# Patient Record
Sex: FEMALE | Race: WHITE | NOT HISPANIC OR LATINO | Employment: OTHER | URBAN - METROPOLITAN AREA
[De-identification: names, ages, dates, MRNs, and addresses within clinical notes are randomized per-mention and may not be internally consistent; named-entity substitution may affect disease eponyms.]

---

## 2017-02-13 ENCOUNTER — TRANSCRIBE ORDERS (OUTPATIENT)
Dept: ADMINISTRATIVE | Facility: HOSPITAL | Age: 47
End: 2017-02-13

## 2017-02-13 DIAGNOSIS — Z12.31 VISIT FOR SCREENING MAMMOGRAM: Primary | ICD-10-CM

## 2017-03-02 ENCOUNTER — HOSPITAL ENCOUNTER (OUTPATIENT)
Dept: MAMMOGRAPHY | Facility: HOSPITAL | Age: 47
Discharge: HOME | End: 2017-03-02
Attending: FAMILY MEDICINE | Admitting: FAMILY MEDICINE

## 2017-03-02 DIAGNOSIS — Z12.31 VISIT FOR SCREENING MAMMOGRAM: ICD-10-CM

## 2017-03-02 PROCEDURE — 77067 SCR MAMMO BI INCL CAD: CPT | Performed by: RADIOLOGY

## 2017-03-02 PROCEDURE — 77063 BREAST TOMOSYNTHESIS BI: CPT

## 2017-03-02 PROCEDURE — 77063 BREAST TOMOSYNTHESIS BI: CPT | Performed by: RADIOLOGY

## 2017-03-02 PROCEDURE — G0202 SCR MAMMO BI INCL CAD: HCPCS

## 2018-06-18 ENCOUNTER — TRANSCRIBE ORDERS (OUTPATIENT)
Dept: ADMINISTRATIVE | Facility: HOSPITAL | Age: 48
End: 2018-06-18

## 2018-06-18 DIAGNOSIS — Z12.31 VISIT FOR SCREENING MAMMOGRAM: Primary | ICD-10-CM

## 2018-06-19 ENCOUNTER — HOSPITAL ENCOUNTER (OUTPATIENT)
Dept: MAMMOGRAPHY | Facility: HOSPITAL | Age: 48
Discharge: HOME | End: 2018-06-19
Attending: FAMILY MEDICINE | Admitting: FAMILY MEDICINE

## 2018-06-19 DIAGNOSIS — Z12.31 VISIT FOR SCREENING MAMMOGRAM: ICD-10-CM

## 2018-06-19 PROCEDURE — 77067 SCR MAMMO BI INCL CAD: CPT

## 2018-06-19 PROCEDURE — 77067 SCR MAMMO BI INCL CAD: CPT | Performed by: RADIOLOGY

## 2018-06-19 PROCEDURE — 77063 BREAST TOMOSYNTHESIS BI: CPT

## 2018-06-19 PROCEDURE — 77063 BREAST TOMOSYNTHESIS BI: CPT | Performed by: RADIOLOGY

## 2019-06-14 ENCOUNTER — TRANSCRIBE ORDERS (OUTPATIENT)
Dept: ADMINISTRATIVE | Facility: HOSPITAL | Age: 49
End: 2019-06-14

## 2019-06-14 DIAGNOSIS — Z12.31 VISIT FOR SCREENING MAMMOGRAM: Primary | ICD-10-CM

## 2019-07-25 ENCOUNTER — HOSPITAL ENCOUNTER (OUTPATIENT)
Dept: MAMMOGRAPHY | Facility: HOSPITAL | Age: 49
Discharge: HOME | End: 2019-07-25
Admitting: FAMILY MEDICINE

## 2019-07-25 DIAGNOSIS — Z12.31 VISIT FOR SCREENING MAMMOGRAM: ICD-10-CM

## 2019-07-25 PROCEDURE — 77067 SCR MAMMO BI INCL CAD: CPT | Performed by: RADIOLOGY

## 2019-07-25 PROCEDURE — 77063 BREAST TOMOSYNTHESIS BI: CPT

## 2019-07-25 PROCEDURE — 77067 SCR MAMMO BI INCL CAD: CPT

## 2019-07-25 PROCEDURE — 77063 BREAST TOMOSYNTHESIS BI: CPT | Performed by: RADIOLOGY

## 2020-03-03 ENCOUNTER — OFFICE VISIT (OUTPATIENT)
Dept: GASTROENTEROLOGY | Facility: CLINIC | Age: 50
End: 2020-03-03

## 2020-03-03 VITALS — WEIGHT: 248.2 LBS | HEART RATE: 60 BPM | DIASTOLIC BLOOD PRESSURE: 83 MMHG | SYSTOLIC BLOOD PRESSURE: 141 MMHG

## 2020-03-03 DIAGNOSIS — R93.3 ABNORMAL FINDINGS ON DIAGNOSTIC IMAGING OF OTHER PARTS OF DIGESTIVE TRACT: Primary | ICD-10-CM

## 2020-03-03 DIAGNOSIS — Z80.0 FAMILY HISTORY OF COLON CANCER IN MOTHER: ICD-10-CM

## 2020-03-03 DIAGNOSIS — N20.0 NEPHROLITHIASIS: ICD-10-CM

## 2020-03-03 DIAGNOSIS — M94.0 COSTOCHONDRITIS: ICD-10-CM

## 2020-03-03 PROCEDURE — 99214 OFFICE O/P EST MOD 30 MIN: CPT | Performed by: INTERNAL MEDICINE

## 2020-03-03 RX ORDER — ATENOLOL 50 MG/1
75 TABLET ORAL DAILY
COMMUNITY

## 2020-03-03 RX ORDER — LISINOPRIL AND HYDROCHLOROTHIAZIDE 25; 20 MG/1; MG/1
1 TABLET ORAL NIGHTLY
COMMUNITY

## 2020-03-03 NOTE — PROGRESS NOTES
GASTROENTEROLOGY OFFICE NOTE  Velvet Rodgers  [id number]  1970    CARE TEAM  Patient Care Team:  Juwan David MD as PCP - General (Family Medicine)    No ref. provider found     Chief complaint:  Abdominal pain  Abnormal imaging study    HISTORY OF PRESENT ILLNESS:  49-year-old white female presents with abdominal pain.  Overall this pain is been present on and off for few years.  Is mild.  She will have this lasts for maybe a few seconds or minutes at a time it can occur 2-3 times per day.  It is unassociated to time of day, activities, food intake, exertion.    As a result of this complaint and ultrasound was obtained which revealed a 1.9 cm mass in the pancreatic neck.  A subsequent pancreatic protocol CT revealed a subtle area of enhancement suggesting a true lesion in the pancreatic neck corresponding to the ultrasound findings and an MRI was recommended.  Incidental left kidney stones were noted with a moderately obstructive stone in the left renal pelvis.  A subsequent MRI of the abdomen revealed a 12 mm nodule which was calcified.  Amylase and lipase and liver enzymes are noted to be within normal limits.  She has no unexplained weight loss nor early satiety and her symptoms are not postprandial in nature.  There is no history of pancreatitis.  She does not abuse alcohol.  There is no family history of pancreatic disease    She is status post unremarkable screening colonoscopy in September 2017 in the setting of family history of mother with colon cancer.  Her mother had colon cancer at age 55.    She presents without dysphagia to solids, odynophagia, early satiety, unexplained weight loss, melanotic stools, constipation/diarrhea or any other localizing GI complaints.    PAST MEDICAL HISTORY  Past Medical History:   Diagnosis Date   • Kidney stones         PAST SURGICAL HISTORY  Past Surgical History:   Procedure Laterality Date   • CHOLECYSTECTOMY     • COLONOSCOPY     • LASER ABLATION CONDYLOMA  CERVICAL / VULVAR          MEDICATIONS:    Current Outpatient Medications:   •  atenolol (TENORMIN) 50 MG tablet, atenolol 50 mg tablet  TAKE 1 and 1/2 TABLET (75 MG) BY ORAL ROUTE ONCE DAILY, Disp: , Rfl:   •  lisinopril-hydrochlorothiazide (PRINZIDE,ZESTORETIC) 20-25 MG per tablet, lisinopril 20 mg-hydrochlorothiazide 25 mg tablet  Take 1 tablet every day by oral route., Disp: , Rfl:     ALLERGIES  No Known Allergies    FAMILY HISTORY:  Family History   Problem Relation Age of Onset   • Colon cancer Mother    • Breast cancer Neg Hx    • Ovarian cancer Neg Hx        SOCIAL HISTORY  Social History     Socioeconomic History   • Marital status:      Spouse name: Not on file   • Number of children: Not on file   • Years of education: Not on file   • Highest education level: Not on file   Tobacco Use   • Smoking status: Never Smoker   • Smokeless tobacco: Never Used   Substance and Sexual Activity   • Alcohol use: Never     Frequency: Never   • Drug use: Never   • Sexual activity: Defer     Socioeconomic History:  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  She is  with children ages 2926 and 21 and she now has 5 grandchildren.  She is a non-smoker/nondrinker.  She lives at home with her .       REVIEW OF SYSTEMS  Review of Systems   Constitutional: Negative for activity change, appetite change, chills, diaphoresis, fatigue, fever, unexpected weight gain and unexpected weight loss.   HENT: Negative for congestion, dental problem, drooling, ear discharge, ear pain, facial swelling, hearing loss, mouth sores, nosebleeds, postnasal drip, rhinorrhea, sinus pressure, sneezing, sore throat, swollen glands, tinnitus, trouble swallowing and voice change.    Respiratory: Negative for apnea, cough, choking, chest tightness, shortness of breath, wheezing and stridor.    Cardiovascular: Negative for chest pain, palpitations and leg swelling.   Gastrointestinal:  Positive for abdominal pain. Negative for abdominal distention, anal bleeding, blood in stool, constipation, diarrhea, nausea, rectal pain, vomiting, GERD and indigestion.   Endocrine: Negative for cold intolerance, heat intolerance, polydipsia, polyphagia and polyuria.   Musculoskeletal: Negative for arthralgias, back pain, gait problem, joint swelling, myalgias, neck pain, neck stiffness and bursitis.   Allergic/Immunologic: Negative for environmental allergies, food allergies and immunocompromised state.   Neurological: Negative for dizziness, tremors, seizures, syncope, facial asymmetry, speech difficulty, weakness, light-headedness, numbness, headache, memory problem and confusion.   Hematological: Negative for adenopathy. Does not bruise/bleed easily.   Psychiatric/Behavioral: Negative for agitation, behavioral problems, decreased concentration, dysphoric mood, hallucinations, self-injury, sleep disturbance, suicidal ideas, negative for hyperactivity, depressed mood and stress. The patient is not nervous/anxious.      I reviewed the above-noted review of systems.    PHYSICAL EXAM   /83 (BP Location: Right arm, Patient Position: Sitting, Cuff Size: Large Adult)   Pulse 60   Wt 113 kg (248 lb 3.2 oz)   General: Alert and oriented x 3. In no apparent or acute distress.  and No stigmata of chronic liver disease  HEENT: Anicteric slcera. Normal oropharynx  Neck: Supple. Without lymphadenopathy  CV: Regular rate and rhythm, S1, S2  Lungs: Clear to ausculation. Without rales, robchi and wheezing  Abdomen:  Soft,non-distended without palpable masses or hepatosplenomeagaly, areas of rebound tenderness or guarding. There is however a trigger point in the right costal margin immediately above the rib that clearly reproduces her presenting complaint with light palpation.  Extremeties: without clubbing, cyanosis or edema  Neurologic:  Alert and oriented x 3 without focal motor or sensory deficits  Rectal exam:  deferred     No results found for this or any previous visit.     Results Review:  I reviewed the patient's new clinical results.  Recent imaging studies and labs are reviewed.      ASSESSMENT  1.-  Calcified pancreatic nodule of unlikely clinical significance.  Repeat imaging study, such as pancreatic mass protocol CT in 6 months is recommended however I will discuss with Dr. Sanders who performs endoscopic ultrasound if he feels an endoscopic ultrasound would be in order.  Given the calcified nature of this lesion malignancy is unlikely and conservative management should suffice.  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  Her symptoms are mild.  Intermittent and she does not feel that any specific intervention is necessary for this  3.-  Family history of mother with colon cancer.  Patient up-to-date on her colon cancer screening and is due for repeat colon cancer screening in 2023  4.-  Nephrolithiasis.  Per primary care    PLAN  1.-  Conservative management of costochondritis  2.-  Management of nephrolithiasis per primary care  3.-  Endoscopic ultrasound versus repeat CT (pancreatic mass protocol) in 6 months  4.-  Return appointment in 6 months  5.-  Screening colonoscopy is due in 2023.  September.      I discussed the patients findings and my recommendations with patient    Bryan Gastelum MD  3/3/2020   4:04 PM    Much of this note is an electronic transcription of spoken language to printed text. Electronic transcription of spoken language may permit erroneous, nonsensical word phrases to be inadvertently transcribed.  Although I have reviewed the note for these errors, some may still be present.

## 2020-03-05 PROBLEM — Z80.0 FAMILY HISTORY OF COLON CANCER IN MOTHER: Status: ACTIVE | Noted: 2020-03-05

## 2020-03-05 PROBLEM — N20.0 NEPHROLITHIASIS: Status: ACTIVE | Noted: 2020-03-05

## 2020-03-05 PROBLEM — R93.3 ABNORMAL FINDINGS ON DIAGNOSTIC IMAGING OF OTHER PARTS OF DIGESTIVE TRACT: Status: ACTIVE | Noted: 2020-03-05

## 2020-03-05 PROBLEM — M94.0 COSTOCHONDRITIS: Status: ACTIVE | Noted: 2020-03-05

## 2020-03-11 DIAGNOSIS — R93.3 ABNORMAL FINDINGS ON DIAGNOSTIC IMAGING OF OTHER PARTS OF DIGESTIVE TRACT: Primary | ICD-10-CM

## 2020-03-13 ENCOUNTER — LAB (OUTPATIENT)
Dept: LAB | Facility: HOSPITAL | Age: 50
End: 2020-03-13

## 2020-03-13 DIAGNOSIS — R93.3 ABNORMAL FINDINGS ON DIAGNOSTIC IMAGING OF OTHER PARTS OF DIGESTIVE TRACT: ICD-10-CM

## 2020-03-13 PROCEDURE — 36415 COLL VENOUS BLD VENIPUNCTURE: CPT

## 2020-03-13 PROCEDURE — 86316 IMMUNOASSAY TUMOR OTHER: CPT

## 2020-03-13 PROCEDURE — 82941 ASSAY OF GASTRIN: CPT

## 2020-03-13 PROCEDURE — 84586 ASSAY OF VIP: CPT

## 2020-03-15 LAB — GASTRIN SERPL-MCNC: 76 PG/ML (ref 0–115)

## 2020-03-16 LAB — CGA SERPL-MCNC: 29.5 NG/ML (ref 0–101.8)

## 2020-03-17 ENCOUNTER — TELEPHONE (OUTPATIENT)
Dept: GASTROENTEROLOGY | Facility: CLINIC | Age: 50
End: 2020-03-17

## 2020-03-17 NOTE — TELEPHONE ENCOUNTER
Discussed with patient.  I had Dr. Sanders review films and he thought there was the potential for this being a gastrointestinal stromal tumor.  Subsequent chromogranin levels and gastrin levels were negative.  I contacted Dr. Judah Jaeger, surgical oncologist at [facility], who agreed to see her and give input as to whether this pancreatic lesion needs to be resected, followed or whether an endoscopic ultrasound would be the next best step.  Patient aware and agreeable and therefore we will wait to hear back from Dr. Jaeger and see what his recommendations are after seeing the patient.  She knows to bring her films with her to appointment

## 2020-03-18 DIAGNOSIS — R93.3 ABNORMAL FINDINGS ON DIAGNOSTIC IMAGING OF OTHER PARTS OF DIGESTIVE TRACT: Primary | ICD-10-CM

## 2020-03-25 LAB — VIP SERPL-MCNC: 22.2 PG/ML (ref 0–58.8)

## 2020-06-09 ENCOUNTER — APPOINTMENT (OUTPATIENT)
Dept: CT IMAGING | Facility: HOSPITAL | Age: 50
End: 2020-06-09

## 2020-06-09 ENCOUNTER — HOSPITAL ENCOUNTER (INPATIENT)
Facility: HOSPITAL | Age: 50
LOS: 1 days | Discharge: HOME | End: 2020-06-11
Attending: EMERGENCY MEDICINE | Admitting: HOSPITALIST

## 2020-06-09 DIAGNOSIS — A41.9 SEPSIS, DUE TO UNSPECIFIED ORGANISM, UNSPECIFIED WHETHER ACUTE ORGAN DYSFUNCTION PRESENT (HCC): ICD-10-CM

## 2020-06-09 DIAGNOSIS — Z96.0 RETAINED URETHRAL STENT: ICD-10-CM

## 2020-06-09 DIAGNOSIS — N39.0 ACUTE UTI: Primary | ICD-10-CM

## 2020-06-09 PROBLEM — R50.9 ACUTE FEBRILE ILLNESS: Status: ACTIVE | Noted: 2020-06-09

## 2020-06-09 PROBLEM — N10 ACUTE PYELONEPHRITIS: Status: ACTIVE | Noted: 2020-06-09

## 2020-06-09 PROBLEM — E87.20 LACTIC ACIDOSIS: Status: ACTIVE | Noted: 2020-06-09

## 2020-06-09 PROBLEM — D72.829 LEUKOCYTOSIS: Status: ACTIVE | Noted: 2020-06-09

## 2020-06-09 PROBLEM — N30.00 ACUTE CYSTITIS: Status: ACTIVE | Noted: 2020-06-09

## 2020-06-09 LAB
ALBUMIN SERPL-MCNC: 4.3 G/DL (ref 3.5–5.2)
ALBUMIN/GLOB SERPL: 1.3 G/DL
ALP SERPL-CCNC: 67 U/L (ref 39–117)
ALT SERPL W P-5'-P-CCNC: 17 U/L (ref 1–33)
ANION GAP SERPL CALCULATED.3IONS-SCNC: 17 MMOL/L (ref 5–15)
AST SERPL-CCNC: 25 U/L (ref 1–32)
BACTERIA UR QL AUTO: ABNORMAL /HPF
BASOPHILS # BLD AUTO: 0.06 10*3/MM3 (ref 0–0.2)
BASOPHILS NFR BLD AUTO: 0.3 % (ref 0–1.5)
BILIRUB SERPL-MCNC: 0.5 MG/DL (ref 0.2–1.2)
BILIRUB UR QL STRIP: NEGATIVE
BUN BLD-MCNC: 11 MG/DL (ref 6–20)
BUN/CREAT SERPL: 10 (ref 7–25)
CALCIUM SPEC-SCNC: 9.5 MG/DL (ref 8.6–10.5)
CHLORIDE SERPL-SCNC: 95 MMOL/L (ref 98–107)
CLARITY UR: ABNORMAL
CO2 SERPL-SCNC: 25 MMOL/L (ref 22–29)
COLOR UR: YELLOW
CREAT BLD-MCNC: 1.1 MG/DL (ref 0.57–1)
D-LACTATE SERPL-SCNC: 3.6 MMOL/L (ref 0.5–2)
DEPRECATED RDW RBC AUTO: 37.4 FL (ref 37–54)
EOSINOPHIL # BLD AUTO: 0.03 10*3/MM3 (ref 0–0.4)
EOSINOPHIL NFR BLD AUTO: 0.1 % (ref 0.3–6.2)
ERYTHROCYTE [DISTWIDTH] IN BLOOD BY AUTOMATED COUNT: 13.1 % (ref 12.3–15.4)
GFR SERPL CREATININE-BSD FRML MDRD: 53 ML/MIN/1.73
GLOBULIN UR ELPH-MCNC: 3.2 GM/DL
GLUCOSE BLD-MCNC: 125 MG/DL (ref 65–99)
GLUCOSE UR STRIP-MCNC: NEGATIVE MG/DL
HCT VFR BLD AUTO: 41.7 % (ref 34–46.6)
HGB BLD-MCNC: 13.6 G/DL (ref 12–15.9)
HGB UR QL STRIP.AUTO: ABNORMAL
HYALINE CASTS UR QL AUTO: ABNORMAL /LPF
IMM GRANULOCYTES # BLD AUTO: 0.1 10*3/MM3 (ref 0–0.05)
IMM GRANULOCYTES NFR BLD AUTO: 0.5 % (ref 0–0.5)
KETONES UR QL STRIP: NEGATIVE
LACTATE HOLD SPECIMEN: NORMAL
LEUKOCYTE ESTERASE UR QL STRIP.AUTO: ABNORMAL
LYMPHOCYTES # BLD AUTO: 1.64 10*3/MM3 (ref 0.7–3.1)
LYMPHOCYTES NFR BLD AUTO: 7.7 % (ref 19.6–45.3)
MCH RBC QN AUTO: 26.3 PG (ref 26.6–33)
MCHC RBC AUTO-ENTMCNC: 32.6 G/DL (ref 31.5–35.7)
MCV RBC AUTO: 80.7 FL (ref 79–97)
MONOCYTES # BLD AUTO: 1.23 10*3/MM3 (ref 0.1–0.9)
MONOCYTES NFR BLD AUTO: 5.8 % (ref 5–12)
NEUTROPHILS # BLD AUTO: 18.22 10*3/MM3 (ref 1.7–7)
NEUTROPHILS NFR BLD AUTO: 85.6 % (ref 42.7–76)
NITRITE UR QL STRIP: POSITIVE
NRBC BLD AUTO-RTO: 0 /100 WBC (ref 0–0.2)
PH UR STRIP.AUTO: 6.5 [PH] (ref 5–8)
PLATELET # BLD AUTO: 249 10*3/MM3 (ref 140–450)
PMV BLD AUTO: 10.7 FL (ref 6–12)
POTASSIUM BLD-SCNC: 3.9 MMOL/L (ref 3.5–5.2)
PROT SERPL-MCNC: 7.5 G/DL (ref 6–8.5)
PROT UR QL STRIP: ABNORMAL
RBC # BLD AUTO: 5.17 10*6/MM3 (ref 3.77–5.28)
RBC # UR: ABNORMAL /HPF
REF LAB TEST METHOD: ABNORMAL
SODIUM BLD-SCNC: 137 MMOL/L (ref 136–145)
SP GR UR STRIP: 1.02 (ref 1–1.03)
SQUAMOUS #/AREA URNS HPF: ABNORMAL /HPF
UROBILINOGEN UR QL STRIP: ABNORMAL
WBC NRBC COR # BLD: 21.28 10*3/MM3 (ref 3.4–10.8)
WBC UR QL AUTO: ABNORMAL /HPF

## 2020-06-09 PROCEDURE — 85025 COMPLETE CBC W/AUTO DIFF WBC: CPT

## 2020-06-09 PROCEDURE — 74176 CT ABD & PELVIS W/O CONTRAST: CPT

## 2020-06-09 PROCEDURE — 99285 EMERGENCY DEPT VISIT HI MDM: CPT

## 2020-06-09 PROCEDURE — 83605 ASSAY OF LACTIC ACID: CPT

## 2020-06-09 PROCEDURE — 81001 URINALYSIS AUTO W/SCOPE: CPT | Performed by: EMERGENCY MEDICINE

## 2020-06-09 PROCEDURE — 80053 COMPREHEN METABOLIC PANEL: CPT

## 2020-06-09 PROCEDURE — 87040 BLOOD CULTURE FOR BACTERIA: CPT | Performed by: EMERGENCY MEDICINE

## 2020-06-09 PROCEDURE — 71250 CT THORAX DX C-: CPT

## 2020-06-09 PROCEDURE — 25010000002 CEFTRIAXONE PER 250 MG: Performed by: EMERGENCY MEDICINE

## 2020-06-09 RX ORDER — ACETAMINOPHEN 500 MG
1000 TABLET ORAL ONCE
Status: COMPLETED | OUTPATIENT
Start: 2020-06-09 | End: 2020-06-09

## 2020-06-09 RX ADMIN — SODIUM CHLORIDE 1 G: 900 INJECTION INTRAVENOUS at 22:11

## 2020-06-09 RX ADMIN — SODIUM CHLORIDE 1000 ML: 9 INJECTION, SOLUTION INTRAVENOUS at 21:43

## 2020-06-09 RX ADMIN — ACETAMINOPHEN 1000 MG: 500 TABLET, FILM COATED ORAL at 21:42

## 2020-06-10 PROBLEM — I10 ESSENTIAL HYPERTENSION: Status: ACTIVE | Noted: 2020-06-10

## 2020-06-10 PROBLEM — N28.9 RENAL INSUFFICIENCY: Status: ACTIVE | Noted: 2020-06-10

## 2020-06-10 LAB
ANION GAP SERPL CALCULATED.3IONS-SCNC: 16 MMOL/L (ref 5–15)
BASOPHILS # BLD AUTO: 0.04 10*3/MM3 (ref 0–0.2)
BASOPHILS NFR BLD AUTO: 0.3 % (ref 0–1.5)
BUN BLD-MCNC: 10 MG/DL (ref 6–20)
BUN/CREAT SERPL: 10.3 (ref 7–25)
CALCIUM SPEC-SCNC: 8.4 MG/DL (ref 8.6–10.5)
CHLORIDE SERPL-SCNC: 100 MMOL/L (ref 98–107)
CO2 SERPL-SCNC: 24 MMOL/L (ref 22–29)
CREAT BLD-MCNC: 0.97 MG/DL (ref 0.57–1)
D-LACTATE SERPL-SCNC: 1.8 MMOL/L (ref 0.5–2)
DEPRECATED RDW RBC AUTO: 40 FL (ref 37–54)
EOSINOPHIL # BLD AUTO: 0.01 10*3/MM3 (ref 0–0.4)
EOSINOPHIL NFR BLD AUTO: 0.1 % (ref 0.3–6.2)
ERYTHROCYTE [DISTWIDTH] IN BLOOD BY AUTOMATED COUNT: 13.3 % (ref 12.3–15.4)
GFR SERPL CREATININE-BSD FRML MDRD: 61 ML/MIN/1.73
GLUCOSE BLD-MCNC: 130 MG/DL (ref 65–99)
HBA1C MFR BLD: 6.1 % (ref 4.8–5.6)
HCT VFR BLD AUTO: 38.6 % (ref 34–46.6)
HGB BLD-MCNC: 12.4 G/DL (ref 12–15.9)
IMM GRANULOCYTES # BLD AUTO: 0.07 10*3/MM3 (ref 0–0.05)
IMM GRANULOCYTES NFR BLD AUTO: 0.5 % (ref 0–0.5)
LYMPHOCYTES # BLD AUTO: 1.54 10*3/MM3 (ref 0.7–3.1)
LYMPHOCYTES NFR BLD AUTO: 10.7 % (ref 19.6–45.3)
MCH RBC QN AUTO: 26.6 PG (ref 26.6–33)
MCHC RBC AUTO-ENTMCNC: 32.1 G/DL (ref 31.5–35.7)
MCV RBC AUTO: 82.7 FL (ref 79–97)
MONOCYTES # BLD AUTO: 0.9 10*3/MM3 (ref 0.1–0.9)
MONOCYTES NFR BLD AUTO: 6.2 % (ref 5–12)
NEUTROPHILS # BLD AUTO: 11.9 10*3/MM3 (ref 1.7–7)
NEUTROPHILS NFR BLD AUTO: 82.2 % (ref 42.7–76)
NRBC BLD AUTO-RTO: 0 /100 WBC (ref 0–0.2)
PLATELET # BLD AUTO: 191 10*3/MM3 (ref 140–450)
PMV BLD AUTO: 10.9 FL (ref 6–12)
POTASSIUM BLD-SCNC: 3 MMOL/L (ref 3.5–5.2)
RBC # BLD AUTO: 4.67 10*6/MM3 (ref 3.77–5.28)
SODIUM BLD-SCNC: 140 MMOL/L (ref 136–145)
WBC NRBC COR # BLD: 14.46 10*3/MM3 (ref 3.4–10.8)

## 2020-06-10 PROCEDURE — 83605 ASSAY OF LACTIC ACID: CPT | Performed by: EMERGENCY MEDICINE

## 2020-06-10 PROCEDURE — 25010000002 PIPERACILLIN SOD-TAZOBACTAM PER 1 G: Performed by: NURSE PRACTITIONER

## 2020-06-10 PROCEDURE — 83036 HEMOGLOBIN GLYCOSYLATED A1C: CPT | Performed by: FAMILY MEDICINE

## 2020-06-10 PROCEDURE — 85025 COMPLETE CBC W/AUTO DIFF WBC: CPT | Performed by: NURSE PRACTITIONER

## 2020-06-10 PROCEDURE — 25010000002 ONDANSETRON PER 1 MG: Performed by: HOSPITALIST

## 2020-06-10 PROCEDURE — 87086 URINE CULTURE/COLONY COUNT: CPT | Performed by: EMERGENCY MEDICINE

## 2020-06-10 PROCEDURE — 25010000002 HEPARIN (PORCINE) PER 1000 UNITS: Performed by: NURSE PRACTITIONER

## 2020-06-10 PROCEDURE — 80048 BASIC METABOLIC PNL TOTAL CA: CPT | Performed by: NURSE PRACTITIONER

## 2020-06-10 PROCEDURE — 99222 1ST HOSP IP/OBS MODERATE 55: CPT | Performed by: FAMILY MEDICINE

## 2020-06-10 RX ORDER — POTASSIUM CHLORIDE 750 MG/1
40 CAPSULE, EXTENDED RELEASE ORAL AS NEEDED
Status: DISCONTINUED | OUTPATIENT
Start: 2020-06-10 | End: 2020-06-11 | Stop reason: HOSPADM

## 2020-06-10 RX ORDER — ONDANSETRON 2 MG/ML
4 INJECTION INTRAMUSCULAR; INTRAVENOUS EVERY 6 HOURS PRN
Status: DISCONTINUED | OUTPATIENT
Start: 2020-06-10 | End: 2020-06-11 | Stop reason: HOSPADM

## 2020-06-10 RX ORDER — HEPARIN SODIUM 5000 [USP'U]/ML
5000 INJECTION, SOLUTION INTRAVENOUS; SUBCUTANEOUS EVERY 8 HOURS SCHEDULED
Status: DISCONTINUED | OUTPATIENT
Start: 2020-06-10 | End: 2020-06-11 | Stop reason: HOSPADM

## 2020-06-10 RX ORDER — SODIUM CHLORIDE 0.9 % (FLUSH) 0.9 %
10 SYRINGE (ML) INJECTION EVERY 12 HOURS SCHEDULED
Status: DISCONTINUED | OUTPATIENT
Start: 2020-06-10 | End: 2020-06-11 | Stop reason: HOSPADM

## 2020-06-10 RX ORDER — ACETAMINOPHEN 325 MG/1
650 TABLET ORAL EVERY 4 HOURS PRN
Status: DISCONTINUED | OUTPATIENT
Start: 2020-06-10 | End: 2020-06-11 | Stop reason: HOSPADM

## 2020-06-10 RX ORDER — SODIUM CHLORIDE 0.9 % (FLUSH) 0.9 %
10 SYRINGE (ML) INJECTION AS NEEDED
Status: DISCONTINUED | OUTPATIENT
Start: 2020-06-10 | End: 2020-06-11 | Stop reason: HOSPADM

## 2020-06-10 RX ORDER — POTASSIUM CHLORIDE 1.5 G/1.77G
40 POWDER, FOR SOLUTION ORAL AS NEEDED
Status: DISCONTINUED | OUTPATIENT
Start: 2020-06-10 | End: 2020-06-11 | Stop reason: HOSPADM

## 2020-06-10 RX ORDER — POTASSIUM CHLORIDE 7.45 MG/ML
10 INJECTION INTRAVENOUS
Status: DISCONTINUED | OUTPATIENT
Start: 2020-06-10 | End: 2020-06-11 | Stop reason: HOSPADM

## 2020-06-10 RX ORDER — SODIUM CHLORIDE 9 MG/ML
100 INJECTION, SOLUTION INTRAVENOUS CONTINUOUS
Status: DISCONTINUED | OUTPATIENT
Start: 2020-06-10 | End: 2020-06-11 | Stop reason: HOSPADM

## 2020-06-10 RX ADMIN — SODIUM CHLORIDE 100 ML/HR: 9 INJECTION, SOLUTION INTRAVENOUS at 04:58

## 2020-06-10 RX ADMIN — ATENOLOL 75 MG: 50 TABLET ORAL at 12:07

## 2020-06-10 RX ADMIN — POTASSIUM CHLORIDE 40 MEQ: 10 CAPSULE, COATED, EXTENDED RELEASE ORAL at 22:32

## 2020-06-10 RX ADMIN — POTASSIUM CHLORIDE 40 MEQ: 10 CAPSULE, COATED, EXTENDED RELEASE ORAL at 19:23

## 2020-06-10 RX ADMIN — TAZOBACTAM SODIUM AND PIPERACILLIN SODIUM 3.38 G: 375; 3 INJECTION, SOLUTION INTRAVENOUS at 17:31

## 2020-06-10 RX ADMIN — ACETAMINOPHEN 650 MG: 325 TABLET, FILM COATED ORAL at 03:22

## 2020-06-10 RX ADMIN — SODIUM CHLORIDE, PRESERVATIVE FREE 10 ML: 5 INJECTION INTRAVENOUS at 09:45

## 2020-06-10 RX ADMIN — TAZOBACTAM SODIUM AND PIPERACILLIN SODIUM 3.38 G: 375; 3 INJECTION, SOLUTION INTRAVENOUS at 03:37

## 2020-06-10 RX ADMIN — SODIUM CHLORIDE, PRESERVATIVE FREE 10 ML: 5 INJECTION INTRAVENOUS at 22:32

## 2020-06-10 RX ADMIN — TAZOBACTAM SODIUM AND PIPERACILLIN SODIUM 3.38 G: 375; 3 INJECTION, SOLUTION INTRAVENOUS at 09:44

## 2020-06-10 RX ADMIN — HEPARIN SODIUM 5000 UNITS: 5000 INJECTION INTRAVENOUS; SUBCUTANEOUS at 05:32

## 2020-06-10 RX ADMIN — POTASSIUM CHLORIDE 40 MEQ: 10 CAPSULE, COATED, EXTENDED RELEASE ORAL at 12:08

## 2020-06-10 RX ADMIN — ONDANSETRON 4 MG: 2 INJECTION INTRAMUSCULAR; INTRAVENOUS at 09:44

## 2020-06-10 RX ADMIN — HEPARIN SODIUM 5000 UNITS: 5000 INJECTION INTRAVENOUS; SUBCUTANEOUS at 22:32

## 2020-06-10 RX ADMIN — HEPARIN SODIUM 5000 UNITS: 5000 INJECTION INTRAVENOUS; SUBCUTANEOUS at 14:01

## 2020-06-10 NOTE — PROGRESS NOTES
Discharge Planning Assessment  [facility]     Patient Name: Velvet Rodgers  MRN: [id number]  Today's Date: 6/10/2020    Admit Date: 6/9/2020    Discharge Needs Assessment     Row Name 06/10/20 1136       Living Environment    Lives With  spouse    Name(s) of Who Lives With Patient  Phoenix Strickland (spouse) [phone]    Current Living Arrangements  home/apartment/condo    Primary Care Provided by  self    Provides Primary Care For  no one    Family Caregiver if Needed  spouse    Family Caregiver Names  Phoenix Strickland (spouse) [phone]    Quality of Family Relationships  helpful;involved;supportive    Able to Return to Prior Arrangements  yes       Resource/Environmental Concerns    Resource/Environmental Concerns  none       Transition Planning    Patient/Family Anticipates Transition to  home with family    Patient/Family Anticipated Services at Transition      Transportation Anticipated  family or friend will provide       Discharge Needs Assessment    Readmission Within the Last 30 Days  no previous admission in last 30 days    Concerns to be Addressed  discharge planning    Equipment Currently Used at Home  none    Anticipated Changes Related to Illness  none    Equipment Needed After Discharge  none    Provided Post Acute Provider List?  Refused    Refused Provider List Comment  Declines services at this time.        Discharge Plan     Row Name 06/10/20 1138       Plan    Plan  Home with family    Patient/Family in Agreement with Plan  yes    Plan Comments  Spoke with patient at bedside.  Patient lives in [place] with [place].  She states that she is independent with ADLS.  She does not use or own DME, and she is not current with home health or other servcies.  States that her PCP is Juwan David MD and [facility]  she states that she has prescription coverage and is able to afford medications.  Plans to discharge home with family when medically ready.  Will  continue to follow for discharge planning.     Final Discharge Disposition Code  01 - home or self-care        Destination      Coordination has not been started for this encounter.      Durable Medical Equipment      Coordination has not been started for this encounter.      Dialysis/Infusion      Coordination has not been started for this encounter.      Home Medical Care      Coordination has not been started for this encounter.      Therapy      Coordination has not been started for this encounter.      Community Resources      Coordination has not been started for this encounter.        Expected Discharge Date and Time     Expected Discharge Date Expected Discharge Time    Jun 12, 2020         Demographic Summary     Row Name 06/10/20 1135       General Information    Admission Type  inpatient    Arrived From  home    Referral Source  admission list    Reason for Consult  discharge planning    Preferred Language  English     Used During This Interaction  no    General Information Comments  PCP:  Juwan David MD confirmed with patient        Functional Status     Row Name 06/10/20 1136       Functional Status    Usual Activity Tolerance  good    Current Activity Tolerance  good       Functional Status, IADL    Medications  independent    Meal Preparation  independent    Housekeeping  independent    Laundry  independent    Shopping  independent        Psychosocial    No documentation.       Abuse/Neglect    No documentation.       Legal    No documentation.       Substance Abuse    No documentation.       Patient Forms    No documentation.           Michelle Mayen RN

## 2020-06-10 NOTE — ED PROVIDER NOTES
EMERGENCY DEPARTMENT ENCOUNTER    Room Number:  28/28  Date of encounter:  6/9/2020  PCP: Juwan David MD  Historian: Patient      HPI:  Chief Complaint: Fever  A complete HPI/ROS/PMH/PSH/SH/FH are unobtainable due to: N/A    Context: Velvet Rodgers is a 49 y.o. female who presents to the ED c/o fever. She recently had a kidney stone in the left ureter which was found incidentally on a CT abdomen. She recently had a kidney stent placed with lithotripsy on 5/27/20 by Dr. Gage. She had hematuria after the surgery but this has resolved. She reports she began experiencing chills last night. She denies sore throat, cough, loss of taste or smell. She denies any known COVID contact. There are no other acute symptoms at this time.      PAST MEDICAL HISTORY  Active Ambulatory Problems     Diagnosis Date Noted   • Abnormal findings on diagnostic imaging of other parts of digestive tract 03/05/2020   • Costochondritis 03/05/2020   • Nephrolithiasis 03/05/2020   • Family history of colon cancer in mother 03/05/2020     Resolved Ambulatory Problems     Diagnosis Date Noted   • No Resolved Ambulatory Problems     Past Medical History:   Diagnosis Date   • Kidney stones          PAST SURGICAL HISTORY  Past Surgical History:   Procedure Laterality Date   • CHOLECYSTECTOMY     • COLONOSCOPY     • LASER ABLATION CONDYLOMA CERVICAL / VULVAR           FAMILY HISTORY  Family History   Problem Relation Age of Onset   • Colon cancer Mother    • Breast cancer Neg Hx    • Ovarian cancer Neg Hx          SOCIAL HISTORY  Social History     Socioeconomic History   • Marital status:      Spouse name: Not on file   • Number of children: Not on file   • Years of education: Not on file   • Highest education level: Not on file   Tobacco Use   • Smoking status: Never Smoker   • Smokeless tobacco: Never Used   Substance and Sexual Activity   • Alcohol use: Never     Frequency: Never   • Drug use: Never   • Sexual activity: Defer          ALLERGIES  Patient has no known allergies.        REVIEW OF SYSTEMS  Review of Systems     All systems reviewed and negative except for those discussed in HPI.       PHYSICAL EXAM    I have reviewed the triage vital signs and nursing notes.    ED Triage Vitals   Temp Heart Rate Resp BP SpO2   06/09/20 1952 06/09/20 1958 06/09/20 1958 06/09/20 1958 06/09/20 1958   100.4 °F (38 °C) 105 20 109/71 96 %      Temp src Heart Rate Source Patient Position BP Location FiO2 (%)   06/09/20 1952 06/09/20 1958 06/09/20 1958 06/09/20 1958 --   Temporal Monitor Sitting Right arm        Physical Exam   Constitutional: She is oriented to person, place, and time and well-developed, well-nourished, and in no distress. No distress.   Pleasant and non-toxic appearing.   HENT:   Head: Normocephalic and atraumatic.   Right Ear: External ear normal.   Left Ear: External ear normal.   Eyes: Conjunctivae are normal. No scleral icterus.   Neck: Normal range of motion. Neck supple.   Cardiovascular: Normal rate, regular rhythm and normal heart sounds.   Pulmonary/Chest: Effort normal and breath sounds normal. No respiratory distress.   Abdominal: Soft. There is no tenderness.   Mild to moderate left CVA tenderness but no right CVA tenderness. Moderately protuberant abdomen with adipose but non-tender throughout.   Musculoskeletal: Normal range of motion. She exhibits no edema.   Neurological: She is alert and oriented to person, place, and time. Coordination normal.   Skin: Skin is warm and dry. She is not diaphoretic.   Psychiatric: Affect and judgment normal.   Nursing note and vitals reviewed.        ED COURSE  ED Course as of Jun 09 2331 Tue Jun 09, 2020 2255 WBC, UA(!): Too Numerous to Count [MS]   2256 We have now administered Rocephin as well as normal saline. Urine is showing obvious infection. We are still awaiting CT scan to be performed. Dr. Goldsmith has paged Dr. Gage.    [JW]   4534 Bacteria, UA(!): 3+ [MS]   2491  WBC(!): 21.28 [MS]   2328 Dr. Goldsmith has discussed the case with   , hospitalist, who will admit the patient. Dr. Hsieh is aware that CT scans are still pending. I have paged Dr. Gage but have not heard back, I will pass this along to Dr. Hsieh and am repaging now. -MAS    [JW]   2330 Dr. Gage has been added to the attending physician list. -MAS    [JW]      ED Course User Index  [JW] Calos Almonte  [MS] Cayetano Goldsmith MD         LAB RESULTS  Recent Results (from the past 24 hour(s))   CBC Auto Differential    Collection Time: 06/09/20  8:11 PM   Result Value Ref Range    WBC 21.28 (H) 3.40 - 10.80 10*3/mm3    RBC 5.17 3.77 - 5.28 10*6/mm3    Hemoglobin 13.6 12.0 - 15.9 g/dL    Hematocrit 41.7 34.0 - 46.6 %    MCV 80.7 79.0 - 97.0 fL    MCH 26.3 (L) 26.6 - 33.0 pg    MCHC 32.6 31.5 - 35.7 g/dL    RDW 13.1 12.3 - 15.4 %    RDW-SD 37.4 37.0 - 54.0 fl    MPV 10.7 6.0 - 12.0 fL    Platelets 249 140 - 450 10*3/mm3    Neutrophil % 85.6 (H) 42.7 - 76.0 %    Lymphocyte % 7.7 (L) 19.6 - 45.3 %    Monocyte % 5.8 5.0 - 12.0 %    Eosinophil % 0.1 (L) 0.3 - 6.2 %    Basophil % 0.3 0.0 - 1.5 %    Immature Grans % 0.5 0.0 - 0.5 %    Neutrophils, Absolute 18.22 (H) 1.70 - 7.00 10*3/mm3    Lymphocytes, Absolute 1.64 0.70 - 3.10 10*3/mm3    Monocytes, Absolute 1.23 (H) 0.10 - 0.90 10*3/mm3    Eosinophils, Absolute 0.03 0.00 - 0.40 10*3/mm3    Basophils, Absolute 0.06 0.00 - 0.20 10*3/mm3    Immature Grans, Absolute 0.10 (H) 0.00 - 0.05 10*3/mm3    nRBC 0.0 0.0 - 0.2 /100 WBC   Comprehensive Metabolic Panel    Collection Time: 06/09/20  8:11 PM   Result Value Ref Range    Glucose 125 (H) 65 - 99 mg/dL    BUN 11 6 - 20 mg/dL    Creatinine 1.10 (H) 0.57 - 1.00 mg/dL    Sodium 137 136 - 145 mmol/L    Potassium 3.9 3.5 - 5.2 mmol/L    Chloride 95 (L) 98 - 107 mmol/L    CO2 25.0 22.0 - 29.0 mmol/L    Calcium 9.5 8.6 - 10.5 mg/dL    Total Protein 7.5 6.0 - 8.5 g/dL    Albumin 4.30 3.50 - 5.20 g/dL    ALT (SGPT) 17 1 - 33 U/L     AST (SGOT) 25 1 - 32 U/L    Alkaline Phosphatase 67 39 - 117 U/L    Total Bilirubin 0.5 0.2 - 1.2 mg/dL    eGFR Non African Amer 53 (L) >60 mL/min/1.73    Globulin 3.2 gm/dL    A/G Ratio 1.3 g/dL    BUN/Creatinine Ratio 10.0 7.0 - 25.0    Anion Gap 17.0 (H) 5.0 - 15.0 mmol/L   Lactic Acid, Plasma    Collection Time: 06/09/20  8:11 PM   Result Value Ref Range    Lactate 3.6 (C) 0.5 - 2.0 mmol/L   Urinalysis With Culture If Indicated - Urine, Clean Catch    Collection Time: 06/09/20 10:08 PM   Result Value Ref Range    Color, UA Yellow Yellow, Straw    Appearance, UA Cloudy (A) Clear    pH, UA 6.5 5.0 - 8.0    Specific Gravity, UA 1.016 1.001 - 1.030    Glucose, UA Negative Negative    Ketones, UA Negative Negative    Bilirubin, UA Negative Negative    Blood, UA Trace (A) Negative    Protein, UA 30 mg/dL (1+) (A) Negative    Leuk Esterase, UA Large (3+) (A) Negative    Nitrite, UA Positive (A) Negative    Urobilinogen, UA 0.2 E.U./dL 0.2 - 1.0 E.U./dL   Urinalysis, Microscopic Only - Urine, Clean Catch    Collection Time: 06/09/20 10:08 PM   Result Value Ref Range    RBC, UA 3-6 (A) None Seen, 0-2 /HPF    WBC, UA Too Numerous to Count (A) None Seen, 0-2 /HPF    Bacteria, UA 3+ (A) None Seen, Trace /HPF    Squamous Epithelial Cells, UA 0-2 None Seen, 0-2 /HPF    Hyaline Casts, UA 7-12 0 - 6 /LPF    Methodology Automated Microscopy        Ordered the above labs and independently reviewed the results.        RADIOLOGY  No Radiology Exams Resulted Within Past 24 Hours        PROCEDURES    Procedures      MEDICATIONS GIVEN IN ER    Medications   sodium chloride 0.9 % bolus 1,000 mL (1,000 mL Intravenous New Bag 6/9/20 4631)   cefTRIAXone (ROCEPHIN) 1 g/50 mL 0.9% NS VTB (mbp) (1 g Intravenous Given 6/9/20 8055)   acetaminophen (TYLENOL) tablet 1,000 mg (1,000 mg Oral Given 6/9/20 9076)         PROGRESS, DATA ANALYSIS, CONSULTS, AND MEDICAL DECISION MAKING    All labs have been independently reviewed by me.  All radiology  studies have been reviewed by me and discussed with radiologist dictating the report.   EKG's independently viewed and interpreted by me.  Discussion below represents my analysis of pertinent findings related to patient's condition, differential diagnosis, treatment plan and final disposition.        ED Course as of Jun 09 2331 Tue Jun 09, 2020 2255 WBC, UA(!): Too Numerous to Count [MS]   2255 We have now administered Rocephin as well as normal saline. Urine is showing obvious infection. We are still awaiting CT scan to be performed. Dr. Goldsmith has paged Dr. Gage.    [JW]   2256 Bacteria, UA(!): 3+ [MS]   2256 WBC(!): 21.28 [MS]   2328 Dr. Goldsmith has discussed the case with   , hospitalist, who will admit the patient. Dr. Hsieh is aware that CT scans are still pending. I have paged Dr. Gage but have not heard back, I will pass this along to Dr. Hsieh and am repaging now. -MAS    [JW]   2330 Dr. Gage has been added to the attending physician list. -MAS    [JW]      ED Course User Index  [JW] Calos Almonte  [MS] Cayetano Goldsmith MD           PPE: Both the patient and I worse a surgical mask throughout the entire patient encounter.     AS OF 23:31 VITALS:    BP - 113/74  HR - 87  TEMP - 98.6 °F (37 °C) (Oral)  O2 SATS - 96%        DIAGNOSIS  Final diagnoses:   Acute UTI   Retained urethral stent   Sepsis, due to unspecified organism, unspecified whether acute organ dysfunction present (CMS/[facility])         DISPOSITION  Admission to hospitalist service.           Calos Almonte  06/09/20 2134       Calos Almonte  06/09/20 2331       Cayetano Goldsmith MD  06/10/20 1313

## 2020-06-10 NOTE — PLAN OF CARE
Problem: Patient Care Overview  Goal: Plan of Care Review  Outcome: Ongoing (interventions implemented as appropriate)  Flowsheets  Taken 6/10/2020 1821  Progress: improving  Outcome Summary: Pt is A/O x4. NSR on tele. pt is on RA. Atenolol restarted today. Pt denies pain this shift, pt reported nausea this AM, resolved with prn Zofran.  Taken 6/10/2020 0800  Plan of Care Reviewed With: patient

## 2020-06-10 NOTE — PROGRESS NOTES
[facility] Medicine Services  ADMISSION FOLLOW-UP NOTE          Patient admitted after midnight, H&P by my partner performed earlier on today's date reviewed.  Interim findings, labs, and charting also reviewed.        [facility] Hospital Problem List has been managed and updated to include any new diagnoses:  Active Hospital Problems    Diagnosis  POA   • **Sepsis (CMS/HCC) [A41.9]  Yes   • Essential hypertension [I10]  Yes   • Renal insufficiency [N28.9]  Yes   • Acute febrile illness [R50.9]  Yes   • Acute cystitis [N30.00]  Yes   • Acute pyelonephritis [N10]  Yes   • Leukocytosis [D72.829]  Yes   • Lactic acidosis [E87.2]  Yes      Resolved Hospital Problems   No resolved problems to display.     50 yo F with hx of HTN and recent lithotripsy with stent placement for a large left renal stone on 5/27/20 who presents due to fevers, chills, and nausea. She is found to have sepsis due to pyelonephritis. Admitted to hospitalist service.    ADDITIONAL PLAN:    Sepsis  Pyelonephritis  --Continue IV fluids and Zosyn.  --Follow urine and blood cultures.  --Leukocytosis improved.    Recent left ureteral stent placement  --Dr. Gage has seen. Unusual to have pyelonephritis this far out from her stent placement, but but will plan to remove stent tomorrow. NPO after midnight.    Hypokalemia  --Replace orally.    Renal insufficiency  --Improved with fluids.    HTN  --Resume Atenolol.  --Hold Lisinopril for now due to mild renal insufficiency on admission.      Electronically signed by Johanna Cantu MD, 06/10/20, 14:13

## 2020-06-10 NOTE — CONSULTS
Urology Consult    Date of Admission: 6/9/2020  Date of Consult: 06/10/20    Primary Care Physician: Juwan Dvaid MD  Referring Physician: Same    Chief complaint/Reason for consultation left-sided kidney stone    Subjective     History of Present Illness Velvet is a 49-year-old woman who is well-known to me.  13 days ago I did cystoscopy left stent and left ESWL for a large stone in her left renal pelvis.  She took several days of oral antibiotics postoperatively and her postoperative course was uneventful until yesterday when she awoke with some fever chills nausea and vomiting.  She was seen in the emergency room and admitted by the hospitalist service for presumed urinary tract infection.    Review of Systems nausea and some vomiting overnight.  She has no dysuria or hematuria she has no chest pain or shortness of breath  Otherwise complete ROS is negative except as mentioned above.    Past Medical History:  has a past medical history of Hypertension, Kidney stones, and Neuroendocrine tumor of pancreas.    Past Surgical History:  has a past surgical history that includes Colonoscopy; Cholecystectomy; Laser ablation condyloma cervical / vulvar; and ureteroscopy laser lithotripsy with stent insertion (Left, 05/27/2020).    Family History: family history includes Colon cancer in her mother; Diabetes in her father.    Social History:  reports that she has never smoked. She has never used smokeless tobacco. She reports that she does not drink alcohol or use drugs.    Medications: Scheduled Meds:  heparin (porcine) 5,000 Units Subcutaneous Q8H   piperacillin-tazobactam 3.375 g Intravenous Q8H   sodium chloride 10 mL Intravenous Q12H     Continuous Infusions:  sodium chloride 100 mL/hr Last Rate: 100 mL/hr (06/10/20 0458)     PRN Meds:.•  acetaminophen  •  ondansetron  •  sodium chloride  No Known Allergies    Objective    Physical Exam:   Vital Signs have been reviewed  Physical Exam  Well-developed  well-nourished white female in mild distress  HEENT: NCAT PERRLA EOMI  Heart: Regular rate and rhythm  Lungs: Clear to auscultation  Abdomen doughy soft and nontender  Pelvic: Deferred  Extremities Free of sinus clubbing or edema    Neurologic grossly intact    Results Reviewed:  I have personally reviewed current lab, radiology, and data and agree with results.  CT scan of the abdomen shows the stone is fragmented in some pieces in the lower pole stent is in good position.  No real hydronephrosis or stranding.  Results from last 7 days   Lab Units 06/10/20  0621   WBC 10*3/mm3 14.46*   HEMOGLOBIN g/dL 12.4   PLATELETS 10*3/mm3 191     Results from last 7 days   Lab Units 06/10/20  0621   SODIUM mmol/L 140   POTASSIUM mmol/L 3.0*   CO2 mmol/L 24.0   BUN mg/dL 10   CREATININE mg/dL 0.97   GLUCOSE mg/dL 130*   CALCIUM mg/dL 8.4*             Sepsis (CMS/HCC)    Acute febrile illness    Acute cystitis    Acute pyelonephritis    Leukocytosis    Lactic acidosis    Essential hypertension    Renal insufficiency        Assessment/Plan   Assessment & Plan postoperative day #13 from my left ESWL.  Unusual to develop urinary tract infection at this time postoperatively but seemingly that has occurred.  Good results from ESWL and I will plan to remove her stent tomorrow prior to her discharge (that was the scheduled removal date in my office)    I discussed the patients findings and my recommendations with: Patient      Barry Gage MD  06/10/20  09:17

## 2020-06-10 NOTE — H&P
[facility] Medicine Services  HISTORY AND PHYSICAL    Patient Name: Velvet Rodgers  : 1970  MRN: [id number]  Primary Care Physician: Juwan David MD  Date of admission: 2020      Subjective   Subjective     Chief Complaint:  fever    HPI:  Velvet Rodgers is a 49 y.o. female with PMH significant for HTN and renal stones who presents to the ED with complaint of acute onset of fever.   She notes that she had lithotripsy with stent placement in the left ureter per Dr Gage on 2020. Since that time she has been doing well. She reports mild dysuria, but states that has been going on since the surgery.   Today, she reports feeling fine when she woke up, but by late morning, she began to have fever and chills. Temp max at home is reported to be 101.  She denies cough, SOB, sore throat, loss of sense of taste or smell, or known COVID exposure. She denies chest pain, N/V/D.   Upon arrival to the ED, she is found to have leukocytosis and lactic acidosis. UA is concerning for UTI with CT abdomen concerning for pyelonephritis.   She will be admitted to Hospital Medicine for further evaluation.     Review of Systems   Constitutional: Positive for chills, fatigue and fever. Negative for activity change and appetite change.   HENT: Negative.    Eyes: Negative for visual disturbance.   Respiratory: Negative for cough, choking, chest tightness, shortness of breath and wheezing.    Cardiovascular: Negative for chest pain, palpitations and leg swelling.   Gastrointestinal: Negative for abdominal distention, abdominal pain, constipation, diarrhea, nausea and vomiting.   Genitourinary: Positive for dysuria and flank pain. Negative for difficulty urinating, frequency and urgency.   Musculoskeletal: Negative for arthralgias and myalgias.   Skin: Negative for color change, pallor and rash.   Neurological: Negative for dizziness, syncope, weakness, light-headedness and headaches.      Psychiatric/Behavioral: Negative for confusion. The patient is not nervous/anxious.         COVID-19 RISK SCREEN     1. Has the patient had close contact without PPE with a lab confirmed COVID-19 (+) person or a person under investigation (PUI) for COVID-19 infection?  -- No     2. Has the patient had respiratory symptoms, worsened/new cough and/or SOA, unexplained fever, or sudden loss of smell and/or taste in the past 7 days? --  No    3. Does the patient have baseline higher exposure risk such as working in healthcare field, currently residing in healthcare facility, or ongoing hemodialysis?  --  No       All other systems reviewed and are negative.     Personal History     Past Medical History:   Diagnosis Date   • Hypertension    • Kidney stones    • Neuroendocrine tumor of pancreas     followed by Dr Jaeger at         Past Surgical History:   Procedure Laterality Date   • CHOLECYSTECTOMY     • COLONOSCOPY     • LASER ABLATION CONDYLOMA CERVICAL / VULVAR     • URETEROSCOPY LASER LITHOTRIPSY WITH STENT INSERTION Left 05/27/2020       Family History: family history includes Colon cancer in her mother; Diabetes in her father. Otherwise pertinent FHx was reviewed and unremarkable.     Social History:  reports that she has never smoked. She has never used smokeless tobacco. She reports that she does not drink alcohol or use drugs.  Social History     Social History Narrative   • Not on file       Medications:  Available home medication information reviewed.    No current facility-administered medications on file prior to encounter.      Current Outpatient Medications on File Prior to Encounter   Medication Sig Dispense Refill   • atenolol (TENORMIN) 50 MG tablet atenolol 50 mg tablet   TAKE 1 and 1/2 TABLET (75 MG) BY ORAL ROUTE ONCE DAILY     • lisinopril-hydrochlorothiazide (PRINZIDE,ZESTORETIC) 20-25 MG per tablet lisinopril 20 mg-hydrochlorothiazide 25 mg tablet   Take 1 tablet every day by oral route.          No Known Allergies    Objective   Objective     Vital Signs:   Temp:  [98.6 °F (37 °C)-100.4 °F (38 °C)] 98.6 °F (37 °C)  Heart Rate:  [] 82  Resp:  [16-20] 16  BP: (107-113)/(64-74) 107/64        Physical Exam   Constitutional: She is oriented to person, place, and time. She appears well-developed and well-nourished. No distress.   HENT:   Head: Normocephalic and atraumatic.   Eyes: Pupils are equal, round, and reactive to light.   Neck: Normal range of motion. Neck supple. No JVD present.   Cardiovascular: Normal rate, regular rhythm, normal heart sounds and intact distal pulses. Exam reveals no gallop and no friction rub.   No murmur heard.  Pulmonary/Chest: Effort normal and breath sounds normal. No respiratory distress. She has no wheezes. She has no rales.   Abdominal: Soft. Bowel sounds are normal. She exhibits no distension. There is no tenderness. There is CVA tenderness (left ). There is no guarding.   Musculoskeletal: Normal range of motion. She exhibits tenderness. She exhibits no edema.   Neurological: She is alert and oriented to person, place, and time.   Skin: Skin is warm and dry. Capillary refill takes less than 2 seconds. No erythema. No pallor.   Psychiatric: She has a normal mood and affect. Her behavior is normal. Thought content normal.   Vitals reviewed.       COVID-19 RISK SCREEN     4. Has the patient had close contact without PPE with a lab confirmed COVID-19 (+) person or a person under investigation (PUI) for COVID-19 infection?  -- No     5. Has the patient had respiratory symptoms, worsened/new cough and/or SOA, unexplained fever, or sudden loss of smell and/or taste in the past 7 days? --  No     6. Does the patient have baseline higher exposure risk such as working in healthcare field, currently residing in healthcare facility, or ongoing hemodialysis?  --  No       Results Reviewed:  I have personally reviewed current lab and radiology data.    Results from last 7 days    Lab Units 06/09/20 2011   WBC 10*3/mm3 21.28*   HEMOGLOBIN g/dL 13.6   HEMATOCRIT % 41.7   PLATELETS 10*3/mm3 249     Results from last 7 days   Lab Units 06/09/20 2011   SODIUM mmol/L 137   POTASSIUM mmol/L 3.9   CHLORIDE mmol/L 95*   CO2 mmol/L 25.0   BUN mg/dL 11   CREATININE mg/dL 1.10*   GLUCOSE mg/dL 125*   CALCIUM mg/dL 9.5   ALT (SGPT) U/L 17   AST (SGOT) U/L 25   LACTATE mmol/L 3.6*     Estimated Creatinine Clearance: 75.7 mL/min (A) (by C-G formula based on SCr of 1.1 mg/dL (H)).  Brief Urine Lab Results  (Last result in the past 365 days)      Color   Clarity   Blood   Leuk Est   Nitrite   Protein   CREAT   Urine HCG        06/09/20 2208 Yellow Cloudy Trace Large (3+) Positive 30 mg/dL (1+)             Imaging Results (Last 24 Hours)     Procedure Component Value Units Date/Time    CT Abdomen Pelvis Without Contrast [[id number]] Collected:  06/09/20 2335     Updated:  06/09/20 2337    Narrative:       CT ABDOMEN AND PELVIS, NONCONTRAST, 6/9/2020    HISTORY:  49-year-old female in the ED with chief complaint of fever. New onset chills last evening. COVID-19 assessment requested. Recent lithotripsy with left-sided stent placement 5/27/2020.    TECHNIQUE:  CT imaging of the abdomen and pelvis ordered without oral or IV contrast. Radiation dose reduction techniques included automated exposure control. Radiation audit for CT and nuclear cardiology exams in the last 12 months: 0.    COMPARISON:  *  No prior abdominal imaging here.    RENAL/URINARY FINDINGS:  A well-positioned left ureteral stent is in place. There is mild dilatation of the left renal pelvis and left renal collecting system. Soft tissue stranding surrounds mildly enlarged left kidney and the left ureter. Several small stone fragments are  present in the lower pole left kidney.    Right kidney and right ureter are normal in appearance. Urinary bladder is nondistended, and there are one or two small stone fragments within the  bladder.    ABDOMEN FINDINGS:  Cholecystectomy. No bile duct dilatation. Liver and spleen are normal in size and appearance. There is fullness on the anterior pancreatic neck with the suggestion of potential subtle round parenchymal lesion measuring about 1.6 cm in diameter (see axial  image 60). This may be artifactual on this noncontrast study, but follow-up dedicated CT or MR imaging with IV contrast is recommended for more detailed assessment. There is no bile duct or pancreatic duct dilatation.    Minimal scattered diverticulosis within the low descending colon. Small bowel and colon are otherwise normal in caliber and appearance, as imaged. Negative appendix. The stomach is nondistended, there is a tiny hiatal hernia. Normal caliber abdominal  aorta. Normal adrenal glands.    PELVIS FINDINGS:  Urinary bladder as noted above. Uterus, adnexal regions and rectum are negative. No inguinal hernia.      Impression:       1.  Mild left hydronephrosis with perirenal and periureteral soft tissue stranding with a well-positioned left ureteral stent in place.  2.  Small stone fragments within the lower pole left kidney and the urinary bladder. The bladder is nondistended.  3.  Question is raised of a small, slightly high attenuation mass in the anterior pancreatic neck for which additional dedicated imaging evaluation is recommended as detailed above.  4.  Cholecystectomy.  5.  Tiny hiatal hernia.    Signer Name: Antony Garcia MD   Signed: 6/9/2020 11:35 PM   Workstation Name: [email or web address]    [facility]    CT Chest Without Contrast [[id number]] Collected:  06/09/20 2329     Updated:  06/09/20 2331    Narrative:       CT CHEST, NONCONTRAST, 6/9/2020    HISTORY:  49-year-old female in the ED with chief complaint of fever. New onset chills last evening. COVID-19 assessment requested. Recent lithotripsy with left-sided stent placement 5/27/2020.    TECHNIQUE:  CT imaging of the chest without IV  contrast. Radiation dose reduction techniques included automated exposure control. Radiation audit for CT and nuclear cardiology exams in the last 12 months: 0.    FINDINGS:  The lungs are expanded and clear. There is no focal, multifocal or diffuse pulmonary infiltrate.    Trachea and central bronchi are normal in caliber and widely patent. No pleural effusion.    Heart size is normal, and there is no significant pericardial effusion. Normal caliber thoracic aorta.    Tiny hiatal hernia. No thoracic esophageal dilatation. No suspicious mass or adenopathy within the chest.      Impression:       1.  Negative chest CT examination. The lungs are clear.  2.  No CT findings present to indicate pneumonia. Note: CT may be negative in the earliest stages of COVID-19.  3.  Tiny hiatal hernia.    Signer Name: Antony Garcia MD   Signed: 6/9/2020 11:29 PM   Workstation Name: [email or web address]    [facility]             Assessment/Plan   Assessment & Plan     Active Hospital Problems    Diagnosis POA   • **Sepsis (CMS/HCC) [A41.9] Yes   • Essential hypertension [I10] Yes   • Renal insufficiency [N28.9] Yes   • Acute febrile illness [R50.9] Yes   • Acute cystitis [N30.00] Yes   • Acute pyelonephritis [N10] Yes   • Leukocytosis [D72.829] Yes   • Lactic acidosis [E87.2] Yes     49 year old female presenting to the ED with complaint of fever who is found to have concern for sepsis secondary to UTI/pyelonephritis     Sepsis: Fever, leukocytosis, lactic acidosis. Source: UTI/pyelonephritis   -CT abdomen as noted above, Ureteral stent remains in place  -Consult Urology in am, Dr Gage  -IVF bolus given in ED, will maintain fluids overnight  -Urine cx pending  -BC x 2 pending  -Lactic reflex pending   -Rocephin started in the ED, will change to Zosyn for now given presence of pyelo and ureteral stent   -CBC, BMP in am     Renal insufficiency   -unclear baseline   -cr+ 1.19  -IVF  -BMP in am     Hypertension  "  -borderline hypotension in ED, will hold antihypertensives for now and restart as appropriate       DVT prophylaxis:  Heparin       Admission Communication  Due to current limited visitation policies, an attempt will be made to update patient's identified best point-of-contact(s) at admission to discuss plan of care.   Contact: pt able to independently contact family    Relation:    Time of communication:    Notes (if applicable):          CODE STATUS:   Code Status and Medical Interventions:   Ordered at: 06/10/20 0032     Code Status:    CPR     Medical Interventions (Level of Support Prior to Arrest):    Full       Admission Status:  I believe this patient meets INPATIENT status due to UTI and Sepsis .  I feel patient’s risk for adverse outcomes and need for care warrant INPATIENT evaluation and I predict the patient’s care encounter to likely last beyond 2 midnights.      Electronically signed by BAILEY Wakefield, 06/10/20, 12:05 AM.        Attending   Admission Attestation       I have seen and examined the patient, performing an independent face-to-face diagnostic evaluation with plan of care reviewed and developed with the advanced practice clinician (APC).      Brief Summary Statement:   Velvet Rodgers is a 49 y.o. female with PMHx of nephrolithiasis and history of recent diagnosis of a neuroendocrine tumor of her pancreas for which she is followed by Dr. Jaeger at the [facility].  Patient underwent lithotripsy and ureteral stent placement on May 27.  States other than some transient hematuria she had done well until she developed a fever and nausea and vomiting on 6/9/2020.  She denies any diarrhea or constipation.  She has had some left flank pain and states she experiences some dysuria especially when passing a kidney stone.  Upon arrival to the ED patient was noted to have a UTI she also and CT of the ABD and pelvis noted  \"Mild left hydronephrosis with perirenal and periureteral soft " "tissue stranding with a well-positioned left ureteral stent in place\". CT also noted \"a small, slightly high attenuation mass in the anterior pancreatic neck\" which pt is aware of and she is followed at  for. Pt was noted to have a UTI and WBC of 21,280 in the ED. She will be admitted for UTI and sepsis and treated with IV rocephin. Pt will be admitted and we will consult Dr Orellana in the am.       Remainder of detailed HPI is as noted by APC and has been reviewed and/or edited by me for completeness.    Attending Physical Exam:  Constitutional: No acute distress, awake, alert  HENT: NCAT, mucous membranes moist  Respiratory: Clear to auscultation bilaterally, respiratory effort normal   Cardiovascular: RRR, no murmurs, rubs, or gallops, palpable pedal pulses bilaterally  Gastrointestinal: Positive bowel sounds, soft, nontender, nondistended  Musculoskeletal: No bilateral ankle edema  Psychiatric: Appropriate affect, cooperative  Neurologic: Oriented x 3, strength symmetric in all extremities, Cranial Nerves grossly intact to confrontation, speech clear  Skin: No rashes      Brief Assessment/Plan :  See detailed assessment and plan developed with APC which I have reviewed and/or edited for completeness.    Electronically signed by Tiffani Cole DO, 06/10/20, 2:20 AM.                  "

## 2020-06-10 NOTE — PLAN OF CARE
Patient was transferred to  from ED around 0300. Pt a&ox4, VSS on RA, independent. Pt receiving IV antibiotics and maintenance fluids. Will continue to monitor.

## 2020-06-11 VITALS
WEIGHT: 242.2 LBS | HEIGHT: 66 IN | RESPIRATION RATE: 18 BRPM | OXYGEN SATURATION: 95 % | SYSTOLIC BLOOD PRESSURE: 114 MMHG | BODY MASS INDEX: 38.92 KG/M2 | TEMPERATURE: 98.5 F | HEART RATE: 76 BPM | DIASTOLIC BLOOD PRESSURE: 74 MMHG

## 2020-06-11 PROBLEM — N30.00 ACUTE CYSTITIS: Status: RESOLVED | Noted: 2020-06-09 | Resolved: 2020-06-11

## 2020-06-11 PROBLEM — N10 ACUTE PYELONEPHRITIS: Status: RESOLVED | Noted: 2020-06-09 | Resolved: 2020-06-11

## 2020-06-11 PROBLEM — E87.20 LACTIC ACIDOSIS: Status: RESOLVED | Noted: 2020-06-09 | Resolved: 2020-06-11

## 2020-06-11 PROBLEM — A41.9 SEPSIS (HCC): Status: RESOLVED | Noted: 2020-06-09 | Resolved: 2020-06-11

## 2020-06-11 PROBLEM — D72.829 LEUKOCYTOSIS: Status: RESOLVED | Noted: 2020-06-09 | Resolved: 2020-06-11

## 2020-06-11 PROBLEM — N28.9 RENAL INSUFFICIENCY: Status: RESOLVED | Noted: 2020-06-10 | Resolved: 2020-06-11

## 2020-06-11 PROBLEM — R50.9 ACUTE FEBRILE ILLNESS: Status: RESOLVED | Noted: 2020-06-09 | Resolved: 2020-06-11

## 2020-06-11 LAB
ANION GAP SERPL CALCULATED.3IONS-SCNC: 11 MMOL/L (ref 5–15)
BACTERIA SPEC AEROBE CULT: NO GROWTH
BUN BLD-MCNC: 10 MG/DL (ref 6–20)
BUN/CREAT SERPL: 9.6 (ref 7–25)
CALCIUM SPEC-SCNC: 8.4 MG/DL (ref 8.6–10.5)
CHLORIDE SERPL-SCNC: 103 MMOL/L (ref 98–107)
CO2 SERPL-SCNC: 27 MMOL/L (ref 22–29)
CREAT BLD-MCNC: 1.04 MG/DL (ref 0.57–1)
DEPRECATED RDW RBC AUTO: 40.9 FL (ref 37–54)
ERYTHROCYTE [DISTWIDTH] IN BLOOD BY AUTOMATED COUNT: 13.4 % (ref 12.3–15.4)
GFR SERPL CREATININE-BSD FRML MDRD: 56 ML/MIN/1.73
GLUCOSE BLD-MCNC: 119 MG/DL (ref 65–99)
HCT VFR BLD AUTO: 38.6 % (ref 34–46.6)
HGB BLD-MCNC: 12.2 G/DL (ref 12–15.9)
MCH RBC QN AUTO: 26.5 PG (ref 26.6–33)
MCHC RBC AUTO-ENTMCNC: 31.6 G/DL (ref 31.5–35.7)
MCV RBC AUTO: 83.7 FL (ref 79–97)
PLATELET # BLD AUTO: 199 10*3/MM3 (ref 140–450)
PMV BLD AUTO: 11.1 FL (ref 6–12)
POTASSIUM BLD-SCNC: 4.2 MMOL/L (ref 3.5–5.2)
RBC # BLD AUTO: 4.61 10*6/MM3 (ref 3.77–5.28)
SODIUM BLD-SCNC: 141 MMOL/L (ref 136–145)
WBC NRBC COR # BLD: 11.21 10*3/MM3 (ref 3.4–10.8)

## 2020-06-11 PROCEDURE — 85027 COMPLETE CBC AUTOMATED: CPT | Performed by: HOSPITALIST

## 2020-06-11 PROCEDURE — 25010000002 PIPERACILLIN SOD-TAZOBACTAM PER 1 G: Performed by: NURSE PRACTITIONER

## 2020-06-11 PROCEDURE — 0TP9XDZ REMOVAL OF INTRALUMINAL DEVICE FROM URETER, EXTERNAL APPROACH: ICD-10-PCS | Performed by: UROLOGY

## 2020-06-11 PROCEDURE — 99239 HOSP IP/OBS DSCHRG MGMT >30: CPT | Performed by: HOSPITALIST

## 2020-06-11 PROCEDURE — 25010000002 HEPARIN (PORCINE) PER 1000 UNITS: Performed by: NURSE PRACTITIONER

## 2020-06-11 PROCEDURE — 80048 BASIC METABOLIC PNL TOTAL CA: CPT | Performed by: HOSPITALIST

## 2020-06-11 RX ORDER — CEFUROXIME AXETIL 250 MG/1
250 TABLET ORAL 2 TIMES DAILY
Qty: 10 TABLET | Refills: 0 | Status: SHIPPED | OUTPATIENT
Start: 2020-06-11 | End: 2020-06-16

## 2020-06-11 RX ADMIN — SODIUM CHLORIDE, PRESERVATIVE FREE 10 ML: 5 INJECTION INTRAVENOUS at 08:12

## 2020-06-11 RX ADMIN — TAZOBACTAM SODIUM AND PIPERACILLIN SODIUM 3.38 G: 375; 3 INJECTION, SOLUTION INTRAVENOUS at 09:54

## 2020-06-11 RX ADMIN — HEPARIN SODIUM 5000 UNITS: 5000 INJECTION INTRAVENOUS; SUBCUTANEOUS at 08:05

## 2020-06-11 RX ADMIN — TAZOBACTAM SODIUM AND PIPERACILLIN SODIUM 3.38 G: 375; 3 INJECTION, SOLUTION INTRAVENOUS at 01:35

## 2020-06-11 RX ADMIN — ATENOLOL 75 MG: 50 TABLET ORAL at 08:05

## 2020-06-11 RX ADMIN — SODIUM CHLORIDE 100 ML/HR: 9 INJECTION, SOLUTION INTRAVENOUS at 08:16

## 2020-06-11 NOTE — PLAN OF CARE
Problem: Patient Care Overview  Goal: Plan of Care Review  Outcome: Ongoing (interventions implemented as appropriate)  Flowsheets  Taken 6/10/2020 1821 by Milligan, Bryanna, RNA  Progress: improving  Taken 6/10/2020 2000 by Megan Costello RN  Plan of Care Reviewed With: patient  Note:   Pt is A&Ox4, RA, NSR. No c/o of pain or discomfort at this time. Pt NPO @ 0000. Will continue to monitor.

## 2020-06-11 NOTE — PROGRESS NOTES
Case Management Discharge Note      Final Note: Spoke with patient at bedside.  Patient discharging home with family today.  Patient declines DME, home health and services.   Follow up appointments have been scheduled for patient and added to aVs.  Family will transfer at discharge.      Provided Post Acute Provider List?: Refused  Refused Provider List Comment: Declines services at this time.    Destination      No service has been selected for the patient.      Durable Medical Equipment      No service has been selected for the patient.      Dialysis/Infusion      No service has been selected for the patient.      Home Medical Care      No service has been selected for the patient.      Therapy      No service has been selected for the patient.      Community Resources      No service has been selected for the patient.             Final Discharge Disposition Code: 01 - home or self-care

## 2020-06-11 NOTE — PROGRESS NOTES
"Daily Progress Note    Patient: Velvet [facility] Day: 3    Subjective: Awake and alert feels much better.  Appetite is still little depressed and no bowel movement in 3 days    Objective:     /75 (BP Location: Left arm, Patient Position: Lying)   Pulse 81   Temp 97.9 °F (36.6 °C) (Oral)   Resp 16   Ht 167.6 cm (66\")   Wt 110 kg (242 lb 3.2 oz)   SpO2 96%   BMI 39.09 kg/m²       Intake/Output Summary (Last 24 hours) at 6/11/2020 0721  Last data filed at 6/10/2020 1800  Gross per 24 hour   Intake 460 ml   Output 900 ml   Net -440 ml       Physical Exam:   General Appearance: alert, appears stated age and cooperative  Head: normocephalic, without obvious abnormality and atraumatic  Lungs respirations regular  Abdomen no masses and soft non tender  Female: Pelvic exam brief just to remove her stent today via string, without ado.  Extremities moves extremities well, no edema, no cyanosis and no redness      Lab Results (last 24 hours)     Procedure Component Value Units Date/Time    Urine Culture - Urine, Urine, Clean Catch [[id number]]  (Normal) Collected:  06/10/20 1400    Specimen:  Urine, Clean Catch Updated:  06/11/20 0709     Urine Culture No growth    Basic Metabolic Panel [[id number]]  (Abnormal) Collected:  06/11/20 0143    Specimen:  Blood Updated:  06/11/20 0249     Glucose 119 mg/dL      BUN 10 mg/dL      Creatinine 1.04 mg/dL      Sodium 141 mmol/L      Potassium 4.2 mmol/L      Chloride 103 mmol/L      CO2 27.0 mmol/L      Calcium 8.4 mg/dL      eGFR Non African Amer 56 mL/min/1.73      BUN/Creatinine Ratio 9.6     Anion Gap 11.0 mmol/L     Narrative:       GFR Normal >60  Chronic Kidney Disease <60  Kidney Failure <15      CBC (No Diff) [[id number]]  (Abnormal) Collected:  06/11/20 0143    Specimen:  Blood Updated:  06/11/20 0222     WBC 11.21 10*3/mm3      RBC 4.61 10*6/mm3      Hemoglobin 12.2 g/dL      Hematocrit 38.6 %      MCV 83.7 fL      MCH 26.5 pg      MCHC 31.6 g/dL      RDW " 13.4 %      RDW-SD 40.9 fl      MPV 11.1 fL      Platelets 199 10*3/mm3     Blood Culture - Blood, Hand, Right [[id number]] Collected:  06/09/20 2144    Specimen:  Blood from Hand, Right Updated:  06/10/20 2301     Blood Culture No growth at 24 hours    Blood Culture - Blood, Hand, Left [[id number]] Collected:  06/09/20 2204    Specimen:  Blood from Hand, Left Updated:  06/10/20 2301     Blood Culture No growth at 24 hours    Hemoglobin A1c [[id number]]  (Abnormal) Collected:  06/10/20 0621    Specimen:  Blood Updated:  06/10/20 0950     Hemoglobin A1C 6.10 %     Narrative:       Hemoglobin A1C Ranges:    Increased Risk for Diabetes  5.7% to 6.4%  Diabetes                     >= 6.5%  Diabetic Goal                < 7.0%    Basic Metabolic Panel [[id number]]  (Abnormal) Collected:  06/10/20 0621    Specimen:  Blood Updated:  06/10/20 0840     Glucose 130 mg/dL      BUN 10 mg/dL      Creatinine 0.97 mg/dL      Sodium 140 mmol/L      Potassium 3.0 mmol/L      Chloride 100 mmol/L      CO2 24.0 mmol/L      Calcium 8.4 mg/dL      eGFR Non African Amer 61 mL/min/1.73      BUN/Creatinine Ratio 10.3     Anion Gap 16.0 mmol/L     Narrative:       GFR Normal >60  Chronic Kidney Disease <60  Kidney Failure <15      CBC Auto Differential [[id number]]  (Abnormal) Collected:  06/10/20 0621    Specimen:  Blood Updated:  06/10/20 0817     WBC 14.46 10*3/mm3      RBC 4.67 10*6/mm3      Hemoglobin 12.4 g/dL      Hematocrit 38.6 %      MCV 82.7 fL      MCH 26.6 pg      MCHC 32.1 g/dL      RDW 13.3 %      RDW-SD 40.0 fl      MPV 10.9 fL      Platelets 191 10*3/mm3      Neutrophil % 82.2 %      Lymphocyte % 10.7 %      Monocyte % 6.2 %      Eosinophil % 0.1 %      Basophil % 0.3 %      Immature Grans % 0.5 %      Neutrophils, Absolute 11.90 10*3/mm3      Lymphocytes, Absolute 1.54 10*3/mm3      Monocytes, Absolute 0.90 10*3/mm3      Eosinophils, Absolute 0.01 10*3/mm3      Basophils, Absolute 0.04 10*3/mm3      Immature Grans, Absolute 0.07  10*3/mm3      nRBC 0.0 /100 WBC           Assessment/Plan: Postop day #15 from left ESWL with stent insertion.  I removed her stent today prior to her discharge home.  I talked with her about the passage of stone fragments over the next week or 2 and she will save those and follow-up in my office in 3 weeks.    Urine cultures and blood cultures have been negative to date and her white count and fever are returning to baseline.      Patient Active Problem List   Diagnosis   • Abnormal findings on diagnostic imaging of other parts of digestive tract   • Costochondritis   • Nephrolithiasis   • Family history of colon cancer in mother   • Acute febrile illness   • Sepsis (CMS/HCC)   • Acute cystitis   • Acute pyelonephritis   • Leukocytosis   • Lactic acidosis   • Essential hypertension   • Renal insufficiency        Diagnosis Plan   1. Acute UTI     2. Retained urethral stent     3. Sepsis, due to unspecified organism, unspecified whether acute organ dysfunction present (CMS/[facility])           Barry Gage MD - 6/11/2020, 07:21

## 2020-06-11 NOTE — DISCHARGE SUMMARY
[facility]  DISCHARGE SUMMARY    Patient Name: Velvet Rodgers  : 1970  MRN: [id number]    Date of Admission: 2020  7:56 PM  Date of Discharge:  20  Primary Care Physician: Juwan David MD    Consults     Date and Time Order Name Status Description    6/10/2020 0307 Inpatient Urology Consult            Hospital Course     Presenting Problem:   Acute febrile illness [R50.9]    Active Hospital Problems    Diagnosis  POA   • Essential hypertension [I10]  Yes      Resolved Hospital Problems    Diagnosis Date Resolved POA   • **Sepsis (CMS/HCC) [A41.9] 2020 Yes   • Renal insufficiency [N28.9] 2020 Yes   • Acute febrile illness [R50.9] 2020 Yes   • Acute cystitis [N30.00] 2020 Yes   • Acute pyelonephritis [N10] 2020 Yes   • Leukocytosis [D72.829] 2020 Yes   • Lactic acidosis [E87.2] 2020 Yes          Hospital Course:  Velvet Rodgers is a 49 y.o. female with hx of HTN and recent lithotripsy with stent placement for a large left renal stone on 20 who presents due to fevers, chills, and nausea. She is found to have sepsis due to pyelonephritis. Admitted to hospitalist service.    Sepsis  Pyelonephritis  --Treated with IV fluids and Zosyn.  --Urine and blood cultures NGTD.  --Leukocytosis nearly resolved.  --Switch to Ceftin x 5 more days.     Recent left ureteral stent placement  --Dr. Gage has seen. Unusual to have pyelonephritis this far out from her stent placement, but he did go on and remove the stent this morning. Plan for follow up in his office in 3 weeks.     Hypokalemia  --Replaced orally with improvement.     Renal insufficiency  --Improved with fluids.      Discharge Follow Up Recommendations for outpatient labs/diagnostics:  F/U with PCP in 1 week  F/U with Dr. Gage in 3 weeks    Day of Discharge     HPI:   Patient seen this morning. Feels 100% better. Wants to go home.    Review of  Systems  Gen-no fevers, no chills  CV-no chest pain, no palpitations  Resp-no cough, no dyspnea  GI-no N/V/D, no abd pain    All other systems reviewed and negative except any additional pertinent positives and negatives as discussed in HPI.      Vital Signs:   Temp:  [97.9 °F (36.6 °C)-99.5 °F (37.5 °C)] 98.6 °F (37 °C)  Heart Rate:  [63-97] 63  Resp:  [16-18] 18  BP: (105-131)/(66-79) 131/79     Physical Exam:  Gen-no acute distress  HENT-NCAT, mucous membranes moist  CV-RRR, S1 S2 normal, no m/r/g  Resp-CTAB, no wheezes or rales  Abd-soft, NT, ND, +BS  Ext-no edema  Neuro-A&Ox3, no focal deficits  Skin-no rashes  Psych-appropriate mood      Pertinent  and/or Most Recent Results     Results from last 7 days   Lab Units 06/11/20  0143 06/10/20  0621 06/09/20  2011   WBC 10*3/mm3 11.21* 14.46* 21.28*   HEMOGLOBIN g/dL 12.2 12.4 13.6   HEMATOCRIT % 38.6 38.6 41.7   PLATELETS 10*3/mm3 199 191 249   SODIUM mmol/L 141 140 137   POTASSIUM mmol/L 4.2 3.0* 3.9   CHLORIDE mmol/L 103 100 95*   CO2 mmol/L 27.0 24.0 25.0   BUN mg/dL 10 10 11   CREATININE mg/dL 1.04* 0.97 1.10*   GLUCOSE mg/dL 119* 130* 125*   CALCIUM mg/dL 8.4* 8.4* 9.5     Results from last 7 days   Lab Units 06/09/20 2011   BILIRUBIN mg/dL 0.5   ALK PHOS U/L 67   ALT (SGPT) U/L 17   AST (SGOT) U/L 25           Invalid input(s): TG, LDLCALC, LDLREALC  Results from last 7 days   Lab Units 06/10/20  0621 06/10/20  0006 06/09/20 2011   HEMOGLOBIN A1C % 6.10*  --   --    LACTATE mmol/L  --  1.8 3.6*       Brief Urine Lab Results  (Last result in the past 365 days)      Color   Clarity   Blood   Leuk Est   Nitrite   Protein   CREAT   Urine HCG        06/09/20 2208 Yellow Cloudy Trace Large (3+) Positive 30 mg/dL (1+)               Microbiology Results Abnormal     Procedure Component Value - Date/Time    Urine Culture - Urine, Urine, Clean Catch [[id number]]  (Normal) Collected:  06/10/20 1400    Lab Status:  Preliminary result Specimen:  Urine, Clean Catch  Updated:  06/11/20 0709     Urine Culture No growth    Blood Culture - Blood, Hand, Right [[id number]] Collected:  06/09/20 2144    Lab Status:  Preliminary result Specimen:  Blood from Hand, Right Updated:  06/10/20 2301     Blood Culture No growth at 24 hours    Blood Culture - Blood, Hand, Left [[id number]] Collected:  06/09/20 2204    Lab Status:  Preliminary result Specimen:  Blood from Hand, Left Updated:  06/10/20 2301     Blood Culture No growth at 24 hours          Imaging Results (All)     Procedure Component Value Units Date/Time    CT Abdomen Pelvis Without Contrast [[id number]] Collected:  06/09/20 2335     Updated:  06/09/20 2337    Narrative:       CT ABDOMEN AND PELVIS, NONCONTRAST, 6/9/2020    HISTORY:  49-year-old female in the ED with chief complaint of fever. New onset chills last evening. COVID-19 assessment requested. Recent lithotripsy with left-sided stent placement 5/27/2020.    TECHNIQUE:  CT imaging of the abdomen and pelvis ordered without oral or IV contrast. Radiation dose reduction techniques included automated exposure control. Radiation audit for CT and nuclear cardiology exams in the last 12 months: 0.    COMPARISON:  *  No prior abdominal imaging here.    RENAL/URINARY FINDINGS:  A well-positioned left ureteral stent is in place. There is mild dilatation of the left renal pelvis and left renal collecting system. Soft tissue stranding surrounds mildly enlarged left kidney and the left ureter. Several small stone fragments are  present in the lower pole left kidney.    Right kidney and right ureter are normal in appearance. Urinary bladder is nondistended, and there are one or two small stone fragments within the bladder.    ABDOMEN FINDINGS:  Cholecystectomy. No bile duct dilatation. Liver and spleen are normal in size and appearance. There is fullness on the anterior pancreatic neck with the suggestion of potential subtle round parenchymal lesion measuring about 1.6 cm in diameter  (see axial  image 60). This may be artifactual on this noncontrast study, but follow-up dedicated CT or MR imaging with IV contrast is recommended for more detailed assessment. There is no bile duct or pancreatic duct dilatation.    Minimal scattered diverticulosis within the low descending colon. Small bowel and colon are otherwise normal in caliber and appearance, as imaged. Negative appendix. The stomach is nondistended, there is a tiny hiatal hernia. Normal caliber abdominal  aorta. Normal adrenal glands.    PELVIS FINDINGS:  Urinary bladder as noted above. Uterus, adnexal regions and rectum are negative. No inguinal hernia.      Impression:       1.  Mild left hydronephrosis with perirenal and periureteral soft tissue stranding with a well-positioned left ureteral stent in place.  2.  Small stone fragments within the lower pole left kidney and the urinary bladder. The bladder is nondistended.  3.  Question is raised of a small, slightly high attenuation mass in the anterior pancreatic neck for which additional dedicated imaging evaluation is recommended as detailed above.  4.  Cholecystectomy.  5.  Tiny hiatal hernia.    Signer Name: Antony Garcia MD   Signed: 6/9/2020 11:35 PM   Workstation Name: [email or web address]    [facility]    CT Chest Without Contrast [[id number]] Collected:  06/09/20 2329     Updated:  06/09/20 2331    Narrative:       CT CHEST, NONCONTRAST, 6/9/2020    HISTORY:  49-year-old female in the ED with chief complaint of fever. New onset chills last evening. COVID-19 assessment requested. Recent lithotripsy with left-sided stent placement 5/27/2020.    TECHNIQUE:  CT imaging of the chest without IV contrast. Radiation dose reduction techniques included automated exposure control. Radiation audit for CT and nuclear cardiology exams in the last 12 months: 0.    FINDINGS:  The lungs are expanded and clear. There is no focal, multifocal or diffuse pulmonary  infiltrate.    Trachea and central bronchi are normal in caliber and widely patent. No pleural effusion.    Heart size is normal, and there is no significant pericardial effusion. Normal caliber thoracic aorta.    Tiny hiatal hernia. No thoracic esophageal dilatation. No suspicious mass or adenopathy within the chest.      Impression:       1.  Negative chest CT examination. The lungs are clear.  2.  No CT findings present to indicate pneumonia. Note: CT may be negative in the earliest stages of COVID-19.  3.  Tiny hiatal hernia.    Signer Name: Antony Garcia MD   Signed: 6/9/2020 11:29 PM   Workstation Name: [email or web address]    [facility] [place]                          Order Current Status    Blood Culture - Blood, Hand, Left Preliminary result    Blood Culture - Blood, Hand, Right Preliminary result    Urine Culture - Urine, Urine, Clean Catch Preliminary result        Discharge Details        Discharge Medications      New Medications      Instructions Start Date   cefuroxime 250 MG tablet  Commonly known as:  Ceftin   250 mg, Oral, 2 Times Daily         Continue These Medications      Instructions Start Date   atenolol 50 MG tablet  Commonly known as:  TENORMIN   atenolol 50 mg tablet   TAKE 1 and 1/2 TABLET (75 MG) BY ORAL ROUTE ONCE DAILY      lisinopril-hydrochlorothiazide 20-25 MG per tablet  Commonly known as:  PRINZIDE,ZESTORETIC   lisinopril 20 mg-hydrochlorothiazide 25 mg tablet   Take 1 tablet every day by oral route.             No Known Allergies      Discharge Disposition:  Home or Self Care    Diet:  Hospital:  Diet Order   Procedures   • Diet Regular       Activity:  Activity Instructions     Activity as Tolerated                 CODE STATUS:    Code Status and Medical Interventions:   Ordered at: 06/10/20 0032     Code Status:    CPR     Medical Interventions (Level of Support Prior to Arrest):    Full       Future Appointments   Date Time Provider Department Center   9/3/2020   1:30 PM Bryan Gastelum MD [facility] None       Additional Instructions for the Follow-ups that You Need to Schedule     Discharge Follow-up with PCP   As directed       Currently Documented PCP:    Juwan David MD    PCP Phone Number:    [phone]     Follow Up Details:  1 week         Discharge Follow-up with Specified Provider: Dr. Gage in 3 weeks   As directed      To:  Dr. Gage in 3 weeks               Time Spent on Discharge:  35 minutes    Electronically signed by Johanna Cantu MD, 06/11/20, 11:37

## 2020-06-14 LAB
BACTERIA SPEC AEROBE CULT: NORMAL
BACTERIA SPEC AEROBE CULT: NORMAL

## 2020-06-19 ENCOUNTER — TRANSCRIBE ORDERS (OUTPATIENT)
Dept: ADMINISTRATIVE | Facility: HOSPITAL | Age: 50
End: 2020-06-19

## 2020-06-19 DIAGNOSIS — Z12.31 VISIT FOR SCREENING MAMMOGRAM: Primary | ICD-10-CM

## 2020-06-29 ENCOUNTER — TELEPHONE (OUTPATIENT)
Dept: GASTROENTEROLOGY | Facility: CLINIC | Age: 50
End: 2020-06-29

## 2020-06-29 NOTE — TELEPHONE ENCOUNTER
PATIENT CALLED ABOUT SCHEDULING AN BIOPSY. SHE SAID DR MORRIS WAS THE ONE THAT REFERRED HER TO YOU. I JUST WANT TO MAKE SURE THIS IS THE PATIENT YOU WERE TALKING ABOUT DOING AN EUS ON. PLEASE LET ME KNOW AND PUT IN CASE REQ SO I CAN SCHEDULE.     [phone]

## 2020-07-07 ENCOUNTER — PREP FOR SURGERY (OUTPATIENT)
Dept: OTHER | Facility: HOSPITAL | Age: 50
End: 2020-07-07

## 2020-07-07 DIAGNOSIS — R93.3 ABNORMAL CT SCAN, GASTROINTESTINAL TRACT: Primary | ICD-10-CM

## 2020-07-07 DIAGNOSIS — K86.89 MASS OF PANCREAS: ICD-10-CM

## 2020-07-08 PROBLEM — R93.3 ABNORMAL CT SCAN, GASTROINTESTINAL TRACT: Status: ACTIVE | Noted: 2020-07-08

## 2020-07-08 PROBLEM — K86.89 MASS OF PANCREAS: Status: ACTIVE | Noted: 2020-07-08

## 2020-07-22 ENCOUNTER — APPOINTMENT (OUTPATIENT)
Dept: PREADMISSION TESTING | Facility: HOSPITAL | Age: 50
End: 2020-07-22

## 2020-07-22 VITALS — WEIGHT: 239.64 LBS | HEIGHT: 66 IN | BODY MASS INDEX: 38.51 KG/M2

## 2020-07-22 DIAGNOSIS — R93.3 ABNORMAL CT SCAN, GASTROINTESTINAL TRACT: ICD-10-CM

## 2020-07-22 DIAGNOSIS — K86.89 MASS OF PANCREAS: ICD-10-CM

## 2020-07-22 LAB
ANION GAP SERPL CALCULATED.3IONS-SCNC: 12 MMOL/L (ref 5–15)
APTT PPP: 29.1 SECONDS (ref 24–37)
BUN SERPL-MCNC: 12 MG/DL (ref 6–20)
BUN/CREAT SERPL: 11.1 (ref 7–25)
CALCIUM SPEC-SCNC: 9.8 MG/DL (ref 8.6–10.5)
CHLORIDE SERPL-SCNC: 100 MMOL/L (ref 98–107)
CO2 SERPL-SCNC: 27 MMOL/L (ref 22–29)
CREAT SERPL-MCNC: 1.08 MG/DL (ref 0.57–1)
DEPRECATED RDW RBC AUTO: 40.4 FL (ref 37–54)
ERYTHROCYTE [DISTWIDTH] IN BLOOD BY AUTOMATED COUNT: 13.2 % (ref 12.3–15.4)
GFR SERPL CREATININE-BSD FRML MDRD: 54 ML/MIN/1.73
GLUCOSE SERPL-MCNC: 110 MG/DL (ref 65–99)
HCT VFR BLD AUTO: 42.4 % (ref 34–46.6)
HGB BLD-MCNC: 13.3 G/DL (ref 12–15.9)
MCH RBC QN AUTO: 26.2 PG (ref 26.6–33)
MCHC RBC AUTO-ENTMCNC: 31.4 G/DL (ref 31.5–35.7)
MCV RBC AUTO: 83.5 FL (ref 79–97)
PLATELET # BLD AUTO: 261 10*3/MM3 (ref 140–450)
PMV BLD AUTO: 10.3 FL (ref 6–12)
POTASSIUM SERPL-SCNC: 4 MMOL/L (ref 3.5–5.2)
RBC # BLD AUTO: 5.08 10*6/MM3 (ref 3.77–5.28)
SODIUM SERPL-SCNC: 139 MMOL/L (ref 136–145)
WBC # BLD AUTO: 8.43 10*3/MM3 (ref 3.4–10.8)

## 2020-07-22 PROCEDURE — C9803 HOPD COVID-19 SPEC COLLECT: HCPCS

## 2020-07-22 PROCEDURE — 93010 ELECTROCARDIOGRAM REPORT: CPT | Performed by: INTERNAL MEDICINE

## 2020-07-22 PROCEDURE — U0002 COVID-19 LAB TEST NON-CDC: HCPCS

## 2020-07-22 PROCEDURE — 93005 ELECTROCARDIOGRAM TRACING: CPT

## 2020-07-22 PROCEDURE — 85730 THROMBOPLASTIN TIME PARTIAL: CPT | Performed by: INTERNAL MEDICINE

## 2020-07-22 PROCEDURE — 85027 COMPLETE CBC AUTOMATED: CPT | Performed by: INTERNAL MEDICINE

## 2020-07-22 PROCEDURE — 36415 COLL VENOUS BLD VENIPUNCTURE: CPT

## 2020-07-22 PROCEDURE — 80048 BASIC METABOLIC PNL TOTAL CA: CPT | Performed by: INTERNAL MEDICINE

## 2020-07-22 PROCEDURE — U0004 COV-19 TEST NON-CDC HGH THRU: HCPCS

## 2020-07-23 ENCOUNTER — TELEPHONE (OUTPATIENT)
Dept: GASTROENTEROLOGY | Facility: CLINIC | Age: 50
End: 2020-07-23

## 2020-07-23 LAB
REF LAB TEST METHOD: ABNORMAL
SARS-COV-2 RNA RESP QL NAA+PROBE: DETECTED

## 2020-07-23 NOTE — TELEPHONE ENCOUNTER
I called and spoke with MsRory Ana Maria regarding the COVID test.  The COVID test was positive.  I informed the patient that she should contact her primary care physician.  She denies any issues at this time such as shortness of breath or chest pain.  The patient has not traveled but her daughter did travel to [place] recently and she is watching her grandchildren.  I informed the patient she would definitely need to quarantine for 14 days and the examination can be rescheduled at a later date.

## 2020-08-14 ENCOUNTER — APPOINTMENT (OUTPATIENT)
Dept: MAMMOGRAPHY | Facility: HOSPITAL | Age: 50
End: 2020-08-14

## 2020-08-28 ENCOUNTER — ANESTHESIA EVENT (OUTPATIENT)
Dept: GASTROENTEROLOGY | Facility: HOSPITAL | Age: 50
End: 2020-08-28

## 2020-08-28 ENCOUNTER — ANESTHESIA (OUTPATIENT)
Dept: GASTROENTEROLOGY | Facility: HOSPITAL | Age: 50
End: 2020-08-28

## 2020-08-28 ENCOUNTER — HOSPITAL ENCOUNTER (OUTPATIENT)
Facility: HOSPITAL | Age: 50
Setting detail: HOSPITAL OUTPATIENT SURGERY
Discharge: HOME | End: 2020-08-28
Attending: INTERNAL MEDICINE | Admitting: INTERNAL MEDICINE

## 2020-08-28 VITALS
TEMPERATURE: 98.6 F | WEIGHT: 245 LBS | RESPIRATION RATE: 16 BRPM | DIASTOLIC BLOOD PRESSURE: 87 MMHG | HEIGHT: 67 IN | OXYGEN SATURATION: 98 % | BODY MASS INDEX: 38.45 KG/M2 | SYSTOLIC BLOOD PRESSURE: 106 MMHG | HEART RATE: 44 BPM

## 2020-08-28 DIAGNOSIS — R93.3 ABNORMAL CT SCAN, GASTROINTESTINAL TRACT: ICD-10-CM

## 2020-08-28 DIAGNOSIS — K86.89 MASS OF PANCREAS: ICD-10-CM

## 2020-08-28 PROCEDURE — S0260 H&P FOR SURGERY: HCPCS | Performed by: INTERNAL MEDICINE

## 2020-08-28 PROCEDURE — 25010000002 PROPOFOL 10 MG/ML EMULSION: Performed by: NURSE ANESTHETIST, CERTIFIED REGISTERED

## 2020-08-28 PROCEDURE — 88305 TISSUE EXAM BY PATHOLOGIST: CPT | Performed by: INTERNAL MEDICINE

## 2020-08-28 PROCEDURE — 25010000002 DEXAMETHASONE PER 1 MG: Performed by: NURSE ANESTHETIST, CERTIFIED REGISTERED

## 2020-08-28 PROCEDURE — 43242 EGD US FINE NEEDLE BX/ASPIR: CPT | Performed by: INTERNAL MEDICINE

## 2020-08-28 PROCEDURE — 25010000002 NEOSTIGMINE 10 MG/10ML SOLUTION: Performed by: NURSE ANESTHETIST, CERTIFIED REGISTERED

## 2020-08-28 PROCEDURE — 88173 CYTOPATH EVAL FNA REPORT: CPT | Performed by: INTERNAL MEDICINE

## 2020-08-28 PROCEDURE — 88172 CYTP DX EVAL FNA 1ST EA SITE: CPT | Performed by: INTERNAL MEDICINE

## 2020-08-28 PROCEDURE — 25010000002 ONDANSETRON PER 1 MG: Performed by: NURSE ANESTHETIST, CERTIFIED REGISTERED

## 2020-08-28 RX ORDER — DEXAMETHASONE SODIUM PHOSPHATE 4 MG/ML
INJECTION, SOLUTION INTRA-ARTICULAR; INTRALESIONAL; INTRAMUSCULAR; INTRAVENOUS; SOFT TISSUE AS NEEDED
Status: DISCONTINUED | OUTPATIENT
Start: 2020-08-28 | End: 2020-08-28 | Stop reason: SURG

## 2020-08-28 RX ORDER — NEOSTIGMINE METHYLSULFATE 1 MG/ML
INJECTION, SOLUTION INTRAVENOUS AS NEEDED
Status: DISCONTINUED | OUTPATIENT
Start: 2020-08-28 | End: 2020-08-28 | Stop reason: SURG

## 2020-08-28 RX ORDER — ONDANSETRON 2 MG/ML
4 INJECTION INTRAMUSCULAR; INTRAVENOUS ONCE AS NEEDED
Status: DISCONTINUED | OUTPATIENT
Start: 2020-08-28 | End: 2020-08-28 | Stop reason: HOSPADM

## 2020-08-28 RX ORDER — SODIUM CHLORIDE, SODIUM LACTATE, POTASSIUM CHLORIDE, CALCIUM CHLORIDE 600; 310; 30; 20 MG/100ML; MG/100ML; MG/100ML; MG/100ML
9 INJECTION, SOLUTION INTRAVENOUS CONTINUOUS
Status: DISCONTINUED | OUTPATIENT
Start: 2020-08-28 | End: 2020-08-28 | Stop reason: HOSPADM

## 2020-08-28 RX ORDER — GLYCOPYRROLATE 0.2 MG/ML
INJECTION INTRAMUSCULAR; INTRAVENOUS AS NEEDED
Status: DISCONTINUED | OUTPATIENT
Start: 2020-08-28 | End: 2020-08-28 | Stop reason: SURG

## 2020-08-28 RX ORDER — SODIUM CHLORIDE, SODIUM LACTATE, POTASSIUM CHLORIDE, AND CALCIUM CHLORIDE .6; .31; .03; .02 G/100ML; G/100ML; G/100ML; G/100ML
IRRIGANT IRRIGATION ONCE
Status: DISCONTINUED | OUTPATIENT
Start: 2020-08-28 | End: 2020-08-28 | Stop reason: HOSPADM

## 2020-08-28 RX ORDER — ROCURONIUM BROMIDE 10 MG/ML
INJECTION, SOLUTION INTRAVENOUS AS NEEDED
Status: DISCONTINUED | OUTPATIENT
Start: 2020-08-28 | End: 2020-08-28 | Stop reason: SURG

## 2020-08-28 RX ORDER — LIDOCAINE HYDROCHLORIDE 10 MG/ML
INJECTION, SOLUTION EPIDURAL; INFILTRATION; INTRACAUDAL; PERINEURAL AS NEEDED
Status: DISCONTINUED | OUTPATIENT
Start: 2020-08-28 | End: 2020-08-28 | Stop reason: SURG

## 2020-08-28 RX ORDER — FAMOTIDINE 10 MG/ML
20 INJECTION, SOLUTION INTRAVENOUS ONCE
Status: COMPLETED | OUTPATIENT
Start: 2020-08-28 | End: 2020-08-28

## 2020-08-28 RX ORDER — ONDANSETRON 2 MG/ML
INJECTION INTRAMUSCULAR; INTRAVENOUS AS NEEDED
Status: DISCONTINUED | OUTPATIENT
Start: 2020-08-28 | End: 2020-08-28 | Stop reason: SURG

## 2020-08-28 RX ORDER — SODIUM CHLORIDE 0.9 % (FLUSH) 0.9 %
10 SYRINGE (ML) INJECTION AS NEEDED
Status: DISCONTINUED | OUTPATIENT
Start: 2020-08-28 | End: 2020-08-28 | Stop reason: HOSPADM

## 2020-08-28 RX ORDER — FAMOTIDINE 10 MG/ML
20 INJECTION, SOLUTION INTRAVENOUS ONCE
Status: DISCONTINUED | OUTPATIENT
Start: 2020-08-28 | End: 2020-08-28 | Stop reason: HOSPADM

## 2020-08-28 RX ORDER — LIDOCAINE HYDROCHLORIDE 10 MG/ML
0.5 INJECTION, SOLUTION EPIDURAL; INFILTRATION; INTRACAUDAL; PERINEURAL ONCE AS NEEDED
Status: DISCONTINUED | OUTPATIENT
Start: 2020-08-28 | End: 2020-08-28 | Stop reason: HOSPADM

## 2020-08-28 RX ORDER — FAMOTIDINE 20 MG/1
20 TABLET, FILM COATED ORAL ONCE
Status: DISCONTINUED | OUTPATIENT
Start: 2020-08-28 | End: 2020-08-28 | Stop reason: HOSPADM

## 2020-08-28 RX ORDER — SODIUM CHLORIDE 0.9 % (FLUSH) 0.9 %
10 SYRINGE (ML) INJECTION EVERY 12 HOURS SCHEDULED
Status: DISCONTINUED | OUTPATIENT
Start: 2020-08-28 | End: 2020-08-28 | Stop reason: HOSPADM

## 2020-08-28 RX ORDER — ESMOLOL HYDROCHLORIDE 10 MG/ML
INJECTION INTRAVENOUS AS NEEDED
Status: DISCONTINUED | OUTPATIENT
Start: 2020-08-28 | End: 2020-08-28 | Stop reason: SURG

## 2020-08-28 RX ORDER — PROPOFOL 10 MG/ML
VIAL (ML) INTRAVENOUS AS NEEDED
Status: DISCONTINUED | OUTPATIENT
Start: 2020-08-28 | End: 2020-08-28 | Stop reason: SURG

## 2020-08-28 RX ADMIN — LIDOCAINE HYDROCHLORIDE 50 MG: 10 INJECTION, SOLUTION EPIDURAL; INFILTRATION; INTRACAUDAL; PERINEURAL at 11:43

## 2020-08-28 RX ADMIN — ONDANSETRON 4 MG: 2 INJECTION INTRAMUSCULAR; INTRAVENOUS at 12:31

## 2020-08-28 RX ADMIN — SODIUM CHLORIDE, POTASSIUM CHLORIDE, SODIUM LACTATE AND CALCIUM CHLORIDE 9 ML/HR: 600; 310; 30; 20 INJECTION, SOLUTION INTRAVENOUS at 11:24

## 2020-08-28 RX ADMIN — NEOSTIGMINE 3 MG: 1 INJECTION INTRAVENOUS at 12:28

## 2020-08-28 RX ADMIN — GLYCOPYRROLATE 0.4 MG: 0.2 INJECTION INTRAMUSCULAR; INTRAVENOUS at 12:28

## 2020-08-28 RX ADMIN — ROCURONIUM BROMIDE 30 MG: 10 INJECTION INTRAVENOUS at 11:43

## 2020-08-28 RX ADMIN — ESMOLOL HYDROCHLORIDE 20 MG: 10 INJECTION, SOLUTION INTRAVENOUS at 11:43

## 2020-08-28 RX ADMIN — SODIUM CHLORIDE, POTASSIUM CHLORIDE, SODIUM LACTATE AND CALCIUM CHLORIDE: 600; 310; 30; 20 INJECTION, SOLUTION INTRAVENOUS at 12:34

## 2020-08-28 RX ADMIN — PROPOFOL 200 MG: 10 INJECTION, EMULSION INTRAVENOUS at 11:43

## 2020-08-28 RX ADMIN — ESMOLOL HYDROCHLORIDE 20 MG: 10 INJECTION, SOLUTION INTRAVENOUS at 11:51

## 2020-08-28 RX ADMIN — FAMOTIDINE 20 MG: 10 INJECTION INTRAVENOUS at 11:24

## 2020-08-28 RX ADMIN — DEXAMETHASONE SODIUM PHOSPHATE 8 MG: 4 INJECTION, SOLUTION INTRAMUSCULAR; INTRAVENOUS at 11:48

## 2020-08-28 NOTE — ANESTHESIA POSTPROCEDURE EVALUATION
Patient: Velvet Rodgers    Procedure Summary     Date:  08/28/20 Room / Location:   [facility]    Anesthesia Start:  1139 Anesthesia Stop:      Procedure:  ENDOSCOPIC ULTRASOUND (UPPER) WITH POSSIBLE FNA (N/A ) Diagnosis:       Abnormal CT scan, gastrointestinal tract      Mass of pancreas      (Abnormal CT scan, gastrointestinal tract [R93.3])      (Mass of pancreas [K86.89])    Surgeon:  Osmany Sanders MD Provider:  Yeyo Miller MD    Anesthesia Type:  general ASA Status:  2          Anesthesia Type: general    Vitals  No vitals data found for the desired time range.          Post Anesthesia Care and Evaluation    Patient location during evaluation: PACU  Patient participation: complete - patient participated  Level of consciousness: awake and awake and alert  Pain score: 0  Pain management: adequate  Airway patency: patent  Anesthetic complications: No anesthetic complications  PONV Status: none  Cardiovascular status: acceptable and stable  Respiratory status: nasal cannula, unassisted, acceptable and spontaneous ventilation  Hydration status: acceptable

## 2020-08-28 NOTE — ANESTHESIA PROCEDURE NOTES
Airway  Urgency: elective    Date/Time: 8/28/2020 11:46 AM  Airway not difficult    General Information and Staff    Patient location during procedure: OR  CRNA: Victor Hugo Coker CRNA    Indications and Patient Condition  Indications for airway management: airway protection    Preoxygenated: yes  MILS not maintained throughout  Mask difficulty assessment: 1 - vent by mask    Final Airway Details  Final airway type: endotracheal airway      Successful airway: ETT  Cuffed: yes   Successful intubation technique: direct laryngoscopy  Endotracheal tube insertion site: oral  Blade: Davis  Blade size: 2  ETT size (mm): 7.0  Cormack-Lehane Classification: grade I - full view of glottis  Placement verified by: chest auscultation and capnometry   Cuff volume (mL): 5  Measured from: lips  ETT/EBT  to lips (cm): 21  Number of attempts at approach: 1  Assessment: lips, teeth, and gum same as pre-op and atraumatic intubation    Additional Comments  Negative epigastric sounds, Breath sound equal bilaterally with symmetric chest rise and fall

## 2020-08-28 NOTE — ANESTHESIA PREPROCEDURE EVALUATION
Anesthesia Evaluation     Patient summary reviewed and Nursing notes reviewed   NPO Solid Status: > 8 hours  NPO Liquid Status: > 8 hours           Airway   Mallampati: I  TM distance: >3 FB  Neck ROM: full  No difficulty expected  Dental          Pulmonary     breath sounds clear to auscultation  (-) COPD, asthma, shortness of breath  Cardiovascular     Rhythm: regular  Rate: normal    (+) hypertension,   (-) CAD      Neuro/Psych  GI/Hepatic/Renal/Endo    (+) obesity,       Musculoskeletal     Abdominal    Substance History      OB/GYN          Other                        Anesthesia Plan    ASA 2     general     intravenous induction     Anesthetic plan, all risks, benefits, and alternatives have been provided, discussed and informed consent has been obtained with: patient.    Plan discussed with CRNA.

## 2020-09-01 ENCOUNTER — TELEPHONE (OUTPATIENT)
Dept: GASTROENTEROLOGY | Facility: CLINIC | Age: 50
End: 2020-09-01

## 2020-09-01 LAB
LAB AP CASE REPORT: NORMAL
PATH REPORT.FINAL DX SPEC: NORMAL

## 2020-09-01 NOTE — TELEPHONE ENCOUNTER
I called and spoke to Ms. Rodgers regarding the cytology results.  The FNA demonstrates necroinflammatory findings in the setting of pancreatic acinar cells.  The EUS clearly demonstrates a well demarcated mass in the neck of the pancreas.  The patient is interested in proceeding with surgery and this is reasonable given no other findings suggest adenopathy or an active  neuroendocrine tumor.  Will discuss the case further with Dr. Jaeger at the [facility].

## 2020-09-04 NOTE — TELEPHONE ENCOUNTER
PATIENT IS SCHEDULED WITH DR MORRIS ON 09/14. PATIENT IS ALREADY AN ESTABLISHED PATIENT WITH DR MORRIS. JUST WANTED TO LET YOU KNOW.

## 2020-12-03 ENCOUNTER — APPOINTMENT (OUTPATIENT)
Dept: MAMMOGRAPHY | Facility: HOSPITAL | Age: 50
End: 2020-12-03

## 2020-12-18 ENCOUNTER — OFFICE VISIT (OUTPATIENT)
Dept: OBSTETRICS AND GYNECOLOGY | Facility: CLINIC | Age: 50
End: 2020-12-18

## 2020-12-18 VITALS
SYSTOLIC BLOOD PRESSURE: 104 MMHG | HEIGHT: 66 IN | DIASTOLIC BLOOD PRESSURE: 68 MMHG | WEIGHT: 219.1 LBS | BODY MASS INDEX: 35.21 KG/M2

## 2020-12-18 DIAGNOSIS — Z01.419 ENCOUNTER FOR ANNUAL ROUTINE GYNECOLOGICAL EXAMINATION: Primary | ICD-10-CM

## 2020-12-18 PROCEDURE — 99396 PREV VISIT EST AGE 40-64: CPT | Performed by: NURSE PRACTITIONER

## 2020-12-18 RX ORDER — GLIPIZIDE 5 MG/1
10 TABLET ORAL
COMMUNITY
Start: 2020-12-17 | End: 2023-03-23

## 2020-12-18 RX ORDER — BLOOD SUGAR DIAGNOSTIC
STRIP MISCELLANEOUS
COMMUNITY
Start: 2020-11-30

## 2020-12-18 RX ORDER — DOCUSATE SODIUM -SENNOSIDES 50; 8.6 MG/1; MG/1
TABLET, COATED ORAL
COMMUNITY
Start: 2020-10-08 | End: 2023-03-23

## 2020-12-18 RX ORDER — LANCETS
1 EACH MISCELLANEOUS DAILY
COMMUNITY
Start: 2020-11-30

## 2020-12-18 NOTE — PROGRESS NOTES
GYN Annual Exam     CC - Here for annual exam.        HPI  Velvet Rodgers is a 50 y.o. female, , who presents for annual well woman exam.  She is postmenopausal.  Patient denies vaginal bleeding.  Patient reports problems with: none. Since her last visit the patient underwent surgery for pancreatic tumor, and as a result, is now diabetic.. Partner Status: Marital Status: .  New Partners since last visit: no.      Additional OB/GYN History   Current contraception: contraceptive methods: Post menopausal status vasectomy. Hot flashes a few years ago and now asymptomatic    On HRT? No  Last Pap :   Last Completed Pap Smear       Status Date      PAP SMEAR Done 2019 negative        History of abnormal Pap smear: no  Family history of uterine, colon, breast, or ovarian cancer: yes - colon cancer in mother  Performs monthly Self-Breast Exam: yes  Last mammogram:   Last Completed Mammogram       Status Date      MAMMOGRAM Done 2019 MAMMO SCREENING DIGITAL TOMOSYNTHESIS BILATERAL W CAD     Patient has more history with this topic...        Last colonoscopy:   Last Completed Colonoscopy       Status Date      COLONOSCOPY Done 10/1/2017 normal, per patient; approximate date. Performed at [facility]        Last DEXA: never  Exercises Regularly: yes  Feelings of Anxiety or Depression: no      Tobacco Usage?: No   OB History        3    Para   3    Term   3            AB        Living   3       SAB        TAB        Ectopic        Molar        Multiple        Live Births   3                Health Maintenance   Topic Date Due   • Annual Gynecologic Pelvic and Breast Exam  1969   • URINE MICROALBUMIN  1969   • ANNUAL PHYSICAL  10/17/1972   • Pneumococcal Vaccine 0-64 (1 of 1 - PPSV23) 10/17/1975   • TDAP/TD VACCINES (1 - Tdap) 10/17/1988   • ZOSTER VACCINE (1 of 2) 10/17/2019   • HEPATITIS C SCREENING  2020   • DIABETIC FOOT EXAM  2020   • DIABETIC EYE  "EXAM  03/03/2020   • INFLUENZA VACCINE  08/01/2020   • HEMOGLOBIN A1C  12/10/2020   • MAMMOGRAM  07/25/2021   • PAP SMEAR  05/21/2022   • COLONOSCOPY  10/01/2027       The additional following portions of the patient's history were reviewed and updated as appropriate: allergies, current medications, past family history, past medical history, past social history, past surgical history and problem list.    Review of Systems   Constitutional: Negative.    Respiratory: Negative.    Cardiovascular: Negative.    Gastrointestinal: Negative.    Genitourinary: Negative.    Skin: Negative.    Neurological: Negative.    Psychiatric/Behavioral: Negative.    All other systems reviewed and are negative.    All other systems reviewed and are negative.     I have reviewed and agree with the HPI, ROS, and historical information as entered above. Brenda Kinney RN    Objective   /68 (BP Location: Right arm, Patient Position: Sitting, Cuff Size: Large Adult)   Ht 167.6 cm (66\")   Wt 99.4 kg (219 lb 1.6 oz)   LMP  (LMP Unknown)   Breastfeeding No   BMI 35.36 kg/m²     Physical Exam  Vitals signs and nursing note reviewed. Exam conducted with a chaperone present.   Constitutional:       Appearance: She is well-developed.   HENT:      Head: Normocephalic and atraumatic.   Neck:      Musculoskeletal: Normal range of motion. No muscular tenderness.      Thyroid: No thyroid mass or thyromegaly.   Cardiovascular:      Rate and Rhythm: Normal rate and regular rhythm.      Heart sounds: No murmur.   Pulmonary:      Effort: Pulmonary effort is normal. No retractions.      Breath sounds: Normal breath sounds. No wheezing, rhonchi or rales.   Chest:      Chest wall: No mass or tenderness.      Breasts:         Right: Normal. No mass, nipple discharge, skin change or tenderness.         Left: Normal. No mass, nipple discharge, skin change or tenderness.   Abdominal:      Palpations: Abdomen is soft. Abdomen is not rigid. There is no " mass.      Tenderness: There is no abdominal tenderness. There is no guarding.      Hernia: No hernia is present. There is no hernia in the left inguinal area.   Genitourinary:     Labia:         Right: No rash, tenderness or lesion.         Left: No rash, tenderness or lesion.       Vagina: Normal. No vaginal discharge or lesions.      Cervix: No cervical motion tenderness, discharge, lesion or cervical bleeding.      Uterus: Normal. Not enlarged, not fixed and not tender.       Adnexa:         Right: No mass or tenderness.          Left: No mass or tenderness.        Rectum: No external hemorrhoid.   Neurological:      Mental Status: She is alert and oriented to person, place, and time.   Psychiatric:         Behavior: Behavior normal.            Assessment and Plan    Problem List Items Addressed This Visit     None      Visit Diagnoses     Encounter for annual routine gynecological examination    -  Primary    Relevant Orders    Pap IG, Rfx HPV ASCU      Mammogram scheduled for march and colonoscopy due 2022    1. GYN annual well woman exam.   2. Reviewed monthly self breast exams.  Instructed to call with lumps, pain, or breast discharge.  Yearly mammograms ordered.  3. Reviewed exercise as a preventative health measures.   4. RTC in 1 year or PRN with problems.    Brenda Kinney RN  12/18/2020

## 2020-12-23 DIAGNOSIS — Z01.419 ENCOUNTER FOR ANNUAL ROUTINE GYNECOLOGICAL EXAMINATION: ICD-10-CM

## 2021-03-19 ENCOUNTER — HOSPITAL ENCOUNTER (OUTPATIENT)
Dept: MAMMOGRAPHY | Facility: HOSPITAL | Age: 51
Discharge: HOME | End: 2021-03-19
Admitting: FAMILY MEDICINE

## 2021-03-19 DIAGNOSIS — Z12.31 VISIT FOR SCREENING MAMMOGRAM: ICD-10-CM

## 2021-03-19 PROCEDURE — 77067 SCR MAMMO BI INCL CAD: CPT | Performed by: RADIOLOGY

## 2021-03-19 PROCEDURE — 77063 BREAST TOMOSYNTHESIS BI: CPT | Performed by: RADIOLOGY

## 2021-03-19 PROCEDURE — 77063 BREAST TOMOSYNTHESIS BI: CPT

## 2021-03-19 PROCEDURE — 77067 SCR MAMMO BI INCL CAD: CPT

## 2021-12-20 ENCOUNTER — OFFICE VISIT (OUTPATIENT)
Dept: OBSTETRICS AND GYNECOLOGY | Facility: CLINIC | Age: 51
End: 2021-12-20

## 2021-12-20 VITALS
BODY MASS INDEX: 30.44 KG/M2 | SYSTOLIC BLOOD PRESSURE: 124 MMHG | WEIGHT: 189.4 LBS | DIASTOLIC BLOOD PRESSURE: 70 MMHG | HEIGHT: 66 IN

## 2021-12-20 DIAGNOSIS — Z12.31 ENCOUNTER FOR SCREENING MAMMOGRAM FOR MALIGNANT NEOPLASM OF BREAST: ICD-10-CM

## 2021-12-20 DIAGNOSIS — Z01.419 WOMEN'S ANNUAL ROUTINE GYNECOLOGICAL EXAMINATION: Primary | ICD-10-CM

## 2021-12-20 DIAGNOSIS — Z12.11 SCREENING FOR COLON CANCER: ICD-10-CM

## 2021-12-20 PROCEDURE — 99396 PREV VISIT EST AGE 40-64: CPT | Performed by: NURSE PRACTITIONER

## 2021-12-20 RX ORDER — ATORVASTATIN CALCIUM 10 MG/1
TABLET, FILM COATED ORAL
COMMUNITY
Start: 2021-12-02

## 2021-12-20 NOTE — PROGRESS NOTES
GYN Annual Exam     CC - Here for annual exam.        HPI  Velvet Rodgers is a 51 y.o. female, , who presents for annual well woman exam.  She is postmenopausal.  Patient denies vaginal bleeding. ..  Patient reports problems with: none. There were no changes to her medical or surgical history since her last visit.. Partner Status: Marital Status: .  New Partners since last visit: no.      Additional OB/GYN History   Current contraception: contraceptive methods: Post menopausal status  Desires to: do not start contraception  On HRT? No  Last Pap : negative  Last Completed Pap Smear          Ordered - PAP SMEAR (Every 3 Years) Ordered on 2021  Pap IG, Rfx HPV ASCU    2019  Done - negative              History of abnormal Pap smear: no  Family history of uterine, colon, breast, or ovarian cancer: yes - Mother-Colon cancer  Performs monthly Self-Breast Exam: yes  Last mammogram: 3/18/2021. Done at .    Last Completed Mammogram          Ordered - MAMMOGRAM (Every 2 Years) Ordered on 2021  Mammo Screening Digital Tomosynthesis Bilateral With CAD    2019  Mammo Screening Digital Tomosynthesis Bilateral With CAD    2018  Mammo Screening Digital Tomosynthesis Bilateral With CAD    2017  Mammo Screening Digital Tomosynthesis Bilateral With CAD    2016  Mammo diagnostic bilateral    Only the first 5 history entries have been loaded, but more history exists.              Last colonoscopy: due   Last Completed Colonoscopy          Ordered - COLORECTAL CANCER SCREENING (COLONOSCOPY - Every 10 Years) Ordered on 2021    10/01/2017  COLONOSCOPY (Done - normal, per patient; approximate date. Performed at [facility])              Last DEXA: None  Exercises Regularly: yes  Feelings of Anxiety or Depression: no      Tobacco Usage?: No   OB History        3    Para   3    Term   3            AB        Living  "  3       SAB        IAB        Ectopic        Molar        Multiple        Live Births   3                Health Maintenance   Topic Date Due   • ANNUAL PHYSICAL  Never done   • COVID-19 Vaccine (1) Never done   • Pneumococcal Vaccine 0-64 (1 of 2 - PPSV23) Never done   • TDAP/TD VACCINES (1 - Tdap) Never done   • ZOSTER VACCINE (1 of 2) Never done   • HEPATITIS C SCREENING  Never done   • INFLUENZA VACCINE  08/01/2021   • Annual Gynecologic Pelvic and Breast Exam  12/19/2021   • MAMMOGRAM  03/19/2023   • PAP SMEAR  12/23/2023   • COLORECTAL CANCER SCREENING  10/01/2027       The additional following portions of the patient's history were reviewed and updated as appropriate: allergies, current medications, past family history, past medical history, past social history, past surgical history and problem list.    Review of Systems   Constitutional: Negative.    Respiratory: Negative.    Cardiovascular: Negative.    Gastrointestinal: Negative.    Genitourinary: Negative.    Neurological: Negative.    Psychiatric/Behavioral: Negative.        I have reviewed and agree with the HPI, ROS, and historical information as entered above. Thuy Campos, APRN    Objective   /70   Ht 167.6 cm (66\")   Wt 85.9 kg (189 lb 6.4 oz)   BMI 30.57 kg/m²     Physical Exam  Vitals and nursing note reviewed. Exam conducted with a chaperone present.   Constitutional:       Appearance: She is well-developed.   HENT:      Head: Normocephalic and atraumatic.   Neck:      Thyroid: No thyroid mass or thyromegaly.   Cardiovascular:      Rate and Rhythm: Normal rate and regular rhythm.      Heart sounds: No murmur heard.      Pulmonary:      Effort: Pulmonary effort is normal. No retractions.      Breath sounds: Normal breath sounds. No wheezing, rhonchi or rales.   Chest:      Chest wall: No mass or tenderness.   Breasts:      Right: Normal. No mass, nipple discharge, skin change or tenderness.      Left: Normal. No mass, nipple " discharge, skin change or tenderness.       Abdominal:      Palpations: Abdomen is soft. Abdomen is not rigid. There is no mass.      Tenderness: There is no abdominal tenderness. There is no guarding.      Hernia: No hernia is present. There is no hernia in the left inguinal area.   Genitourinary:     Labia:         Right: No rash, tenderness or lesion.         Left: No rash, tenderness or lesion.       Vagina: Normal. No vaginal discharge or lesions.      Cervix: No cervical motion tenderness, discharge, lesion or cervical bleeding.      Uterus: Normal. Not enlarged, not fixed and not tender.       Adnexa:         Right: No mass or tenderness.          Left: No mass or tenderness.        Rectum: No external hemorrhoid.   Musculoskeletal:      Cervical back: Normal range of motion. No muscular tenderness.   Neurological:      Mental Status: She is alert and oriented to person, place, and time.   Psychiatric:         Behavior: Behavior normal.            Assessment and Plan    Problem List Items Addressed This Visit     None      Visit Diagnoses     Women's annual routine gynecological examination    -  Primary    Relevant Orders    Pap IG, HPV-hr    Encounter for screening mammogram for malignant neoplasm of breast        Relevant Orders    Mammo Screening Digital Tomosynthesis Bilateral With CAD    Screening for colon cancer        Relevant Orders    Ambulatory Referral For Screening Colonoscopy        Mammogram due March 2022, colonoscopy due 2022.    1. GYN annual well woman exam.   2. Reviewed monthly self breast exams.  Instructed to call with lumps, pain, or breast discharge.  Yearly mammograms ordered.  3. Ordered mammogram today.  4. Recommended use of Vitamin D and getting adequate calcium in her diet. (1500mg)  5. Colonoscopy recommended.  6. Return in about 1 year (around 12/20/2022) for Annual physical with BDS.     Thuy Campos, APRN  12/20/2021

## 2022-01-11 DIAGNOSIS — Z01.419 WOMEN'S ANNUAL ROUTINE GYNECOLOGICAL EXAMINATION: ICD-10-CM

## 2022-03-21 ENCOUNTER — HOSPITAL ENCOUNTER (OUTPATIENT)
Dept: MAMMOGRAPHY | Facility: HOSPITAL | Age: 52
Discharge: HOME | End: 2022-03-21
Admitting: NURSE PRACTITIONER

## 2022-03-21 DIAGNOSIS — Z12.31 ENCOUNTER FOR SCREENING MAMMOGRAM FOR MALIGNANT NEOPLASM OF BREAST: ICD-10-CM

## 2022-03-21 PROCEDURE — 77067 SCR MAMMO BI INCL CAD: CPT

## 2022-03-21 PROCEDURE — 77063 BREAST TOMOSYNTHESIS BI: CPT | Performed by: RADIOLOGY

## 2022-03-21 PROCEDURE — 77067 SCR MAMMO BI INCL CAD: CPT | Performed by: RADIOLOGY

## 2022-03-21 PROCEDURE — 77063 BREAST TOMOSYNTHESIS BI: CPT

## 2022-08-10 ENCOUNTER — TELEPHONE (OUTPATIENT)
Dept: GASTROENTEROLOGY | Facility: CLINIC | Age: 52
End: 2022-08-10

## 2023-02-17 ENCOUNTER — TRANSCRIBE ORDERS (OUTPATIENT)
Dept: ADMINISTRATIVE | Facility: HOSPITAL | Age: 53
End: 2023-02-17
Payer: COMMERCIAL

## 2023-02-17 DIAGNOSIS — Z12.31 VISIT FOR SCREENING MAMMOGRAM: Primary | ICD-10-CM

## 2023-03-23 ENCOUNTER — OFFICE VISIT (OUTPATIENT)
Dept: OBSTETRICS AND GYNECOLOGY | Facility: CLINIC | Age: 53
End: 2023-03-23
Payer: COMMERCIAL

## 2023-03-23 VITALS
WEIGHT: 199 LBS | SYSTOLIC BLOOD PRESSURE: 118 MMHG | HEIGHT: 66 IN | BODY MASS INDEX: 31.98 KG/M2 | DIASTOLIC BLOOD PRESSURE: 62 MMHG

## 2023-03-23 DIAGNOSIS — Z01.419 WOMEN'S ANNUAL ROUTINE GYNECOLOGICAL EXAMINATION: ICD-10-CM

## 2023-03-23 DIAGNOSIS — Z13.820 OSTEOPOROSIS SCREENING: Primary | ICD-10-CM

## 2023-03-23 NOTE — PROGRESS NOTES
Gynecologic Annual Exam Note        GYN Annual Exam     CC - Here for annual exam.        HPI  Velvet Rodgers is a 52 y.o. female, , who presents for annual well woman exam as a established patient.  She is perimenopausal vs postmenopausal s/p endometrial ablation. Denies vaginal bleeding.  Patient reports problems with: none. There were no changes to her medical or surgical history since her last visit.. Partner Status: Marital Status: .  She is is sexually active. She has not had new partners. STD testing recommendations have been explained to the patient and she does not desire STD testing.    Additional OB/GYN History   On HRT? No    Last Pap : 2021. Results: negative. HPV: negative.   Last Completed Pap Smear          PAP SMEAR (Every 3 Years) Next due on 2024  SCANNED - PAP SMEAR    2020  Pap IG, Rfx HPV ASCU    2019  Done - negative              History of abnormal Pap smear: no  Family history of uterine, colon, breast, or ovarian cancer: yes - Mother- Colon  Performs monthly Self-Breast Exam: yes  Last mammogram: 3/21/2022. Done at [facility].    Last Completed Mammogram          Scheduled - MAMMOGRAM (Every 2 Years) Scheduled for 2022  Mammo Screening Digital Tomosynthesis Bilateral With CAD    2021  Mammo Screening Digital Tomosynthesis Bilateral With CAD    2019  Mammo Screening Digital Tomosynthesis Bilateral With CAD    2018  Mammo Screening Digital Tomosynthesis Bilateral With CAD    2017  Mammo Screening Digital Tomosynthesis Bilateral With CAD    Only the first 5 history entries have been loaded, but more history exists.              Last colonoscopy: 10/2022    Last Completed Colonoscopy          COLORECTAL CANCER SCREENING (COLONOSCOPY - Every 10 Years) Next due on 10/1/2027    10/01/2017  COLONOSCOPY (Done - normal, per patient; approximate date. Performed at [facility])                   Last bone density scan (DEXA): None  Exercises Regularly: no  Feelings of Anxiety or Depression: no      Tobacco Usage?: No       Current Outpatient Medications:   •  Accu-Chek FastClix Lancets misc, 1 each by Other route Daily., Disp: , Rfl:   •  Accu-Chek Guide test strip, USE 1 ONCE DAILY, Disp: , Rfl:   •  atenolol (TENORMIN) 50 MG tablet, Take 1.5 tablets by mouth Daily., Disp: , Rfl:   •  atorvastatin (LIPITOR) 10 MG tablet, , Disp: , Rfl:   •  lisinopril-hydrochlorothiazide (PRINZIDE,ZESTORETIC) 20-25 MG per tablet, Take 1 tablet by mouth Every Night., Disp: , Rfl:   •  metFORMIN (GLUCOPHAGE) 500 MG tablet, Take 1 tablet by mouth Daily. Daily for 4 weeks, then switch to BID, Disp: , Rfl:   •  SITagliptin (JANUVIA) 100 MG tablet, Take 1 tablet by mouth Daily., Disp: , Rfl:     Patient denies the need for medication refills today.    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   • Diabetes mellitus, type II (HCC) 2020    as a result of tumor removed from pancreas   • History of urinary tract infection    • Hypertension    • Kidney stones     x3-4      2 had surgery 1 passed on own    • Neuroendocrine tumor of pancreas 2020    removed by Dr Jaeger at  10/2020 (first symptoms )   • Wears glasses         Past Surgical History:   Procedure Laterality Date   • CHOLECYSTECTOMY     • ENDOMETRIAL ABLATION      with polypectomy for abnormal uterine bleeding   • LAPAROSCOPIC CHOLECYSTECTOMY     • MOLE REMOVAL  2007    from face   • SPLENECTOMY  10/20   • TUMOR REMOVAL  10/02/2020    from pancreas   • URETEROSCOPY LASER LITHOTRIPSY WITH STENT INSERTION Left 05/27/2020    x2   • WISDOM TOOTH EXTRACTION      all 4 removed        Health Maintenance   Topic Date  "Due   • COVID-19 Vaccine (1) Never done   • Pneumococcal Vaccine 0-64 (1 - PCV) Never done   • TDAP/TD VACCINES (1 - Tdap) Never done   • ZOSTER VACCINE (1 of 2) Never done   • HEPATITIS C SCREENING  Never done   • ANNUAL PHYSICAL  Never done   • DIABETIC FOOT EXAM  Never done   • INFLUENZA VACCINE  08/01/2022   • DIABETIC EYE EXAM  09/14/2022   • Annual Gynecologic Pelvic and Breast Exam  12/21/2022   • URINE MICROALBUMIN  08/26/2023   • HEMOGLOBIN A1C  09/22/2023   • MAMMOGRAM  03/21/2024   • PAP SMEAR  12/20/2024   • COLORECTAL CANCER SCREENING  10/01/2027       The additional following portions of the patient's history were reviewed and updated as appropriate: allergies, current medications, past family history, past medical history, past social history, past surgical history and problem list.    Review of Systems   Constitutional: Negative.    HENT: Negative.    Eyes: Negative.    Respiratory: Negative.    Cardiovascular: Negative.    Gastrointestinal: Negative.    Endocrine: Negative.    Genitourinary: Negative.    Musculoskeletal: Negative.    Skin: Negative.    Allergic/Immunologic: Negative.    Neurological: Negative.    Hematological: Negative.    Psychiatric/Behavioral: Negative.        I have reviewed and agree with the HPI, ROS, and historical information as entered above. Thuy Campos, APRN    Objective   /62   Ht 167.6 cm (66\")   Wt 90.3 kg (199 lb)   BMI 32.12 kg/m²     Physical Exam  Vitals and nursing note reviewed. Exam conducted with a chaperone present.   Constitutional:       Appearance: She is well-developed.   HENT:      Head: Normocephalic and atraumatic.   Neck:      Thyroid: No thyroid mass or thyromegaly.   Pulmonary:      Effort: Pulmonary effort is normal. No retractions.   Chest:      Chest wall: No mass or tenderness.   Breasts:     Right: Normal. No mass, nipple discharge, skin change or tenderness.      Left: Normal. No mass, nipple discharge, skin change or " tenderness.   Abdominal:      Palpations: Abdomen is soft. Abdomen is not rigid. There is no mass.      Tenderness: There is no abdominal tenderness. There is no guarding.      Hernia: No hernia is present. There is no hernia in the left inguinal area.   Genitourinary:     Labia:         Right: No rash, tenderness or lesion.         Left: No rash, tenderness or lesion.       Vagina: Normal. No vaginal discharge or lesions.      Cervix: No cervical motion tenderness, discharge, lesion or cervical bleeding.      Uterus: Normal. Not enlarged, not fixed and not tender.       Adnexa:         Right: No mass or tenderness.          Left: No mass or tenderness.        Rectum: No external hemorrhoid.   Musculoskeletal:      Cervical back: Normal range of motion. No muscular tenderness.   Neurological:      Mental Status: She is alert and oriented to person, place, and time.   Psychiatric:         Behavior: Behavior normal.            Assessment and Plan    Problem List Items Addressed This Visit    None  Visit Diagnoses     Osteoporosis screening    -  Primary    Relevant Orders    DEXA Bone Density Axial    Women's annual routine gynecological examination            Mammo scheduled for april 1. GYN annual well woman exam.   2. Reviewed monthly self breast exams.  Instructed to call with lumps, pain, or breast discharge.  Yearly mammograms ordered.  3. Recommended use of Vitamin D and getting adequate calcium in her diet. (1500mg)  4. Osteoporosis screening ordered today.  5. Return in about 1 year (around 3/23/2024) for Annual physical and BDS.     Thuy Campos, APRN  03/23/2023

## 2023-04-21 ENCOUNTER — HOSPITAL ENCOUNTER (OUTPATIENT)
Dept: MAMMOGRAPHY | Facility: HOSPITAL | Age: 53
Discharge: HOME | End: 2023-04-21
Admitting: NURSE PRACTITIONER
Payer: COMMERCIAL

## 2023-04-21 DIAGNOSIS — Z12.31 VISIT FOR SCREENING MAMMOGRAM: ICD-10-CM

## 2023-04-21 PROCEDURE — 77067 SCR MAMMO BI INCL CAD: CPT

## 2023-04-21 PROCEDURE — 77063 BREAST TOMOSYNTHESIS BI: CPT

## 2024-02-15 ENCOUNTER — TRANSCRIBE ORDERS (OUTPATIENT)
Dept: ADMINISTRATIVE | Facility: HOSPITAL | Age: 54
End: 2024-02-15
Payer: COMMERCIAL

## 2024-02-15 DIAGNOSIS — Z12.31 VISIT FOR SCREENING MAMMOGRAM: Primary | ICD-10-CM

## 2024-03-20 ENCOUNTER — TELEPHONE (OUTPATIENT)
Dept: OBSTETRICS AND GYNECOLOGY | Facility: CLINIC | Age: 54
End: 2024-03-20

## 2024-03-20 NOTE — TELEPHONE ENCOUNTER
"    Caller: Velvet Rodgers \"Mallorie\"    Relationship: Self    Best call back number: [phone]  OKAY TO [facility] IF ITS SET UP     What orders are you requesting (i.e. lab or imaging): ULTRASOUND     In what timeframe would the patient need to come in: 3/27/24 - WITH ANNUAL AND DEXA       Additional notes: POLYP REMOVED FROM CERVIX OR UTERUS 10+ YEARS AGO , PATIENT FEELS LIKE SHE MAY NEED AN U/S       "

## 2024-03-20 NOTE — TELEPHONE ENCOUNTER
Pt states she had polyp removed from cervix or uterus 10+ years ago. Pt's last annual was with BAILEY Sellers in 3/2023. Pt states since then she has had other growth on body removed. Pt wants to know if we can add US to her visit. Pt is not having any vaginal bleeding or pain. Per Angeline Luke RN, pt is to wait until she is seen for her annual appt to see if US is needed. Pt v/u.

## 2024-04-29 ENCOUNTER — HOSPITAL ENCOUNTER (OUTPATIENT)
Dept: MAMMOGRAPHY | Facility: HOSPITAL | Age: 54
Discharge: HOME | End: 2024-04-29
Admitting: INTERNAL MEDICINE
Payer: COMMERCIAL

## 2024-04-29 DIAGNOSIS — Z12.31 VISIT FOR SCREENING MAMMOGRAM: ICD-10-CM

## 2024-04-29 PROCEDURE — 77067 SCR MAMMO BI INCL CAD: CPT

## 2024-04-29 PROCEDURE — 77063 BREAST TOMOSYNTHESIS BI: CPT

## 2024-05-02 PROCEDURE — 77063 BREAST TOMOSYNTHESIS BI: CPT | Performed by: RADIOLOGY

## 2024-05-02 PROCEDURE — 77067 SCR MAMMO BI INCL CAD: CPT | Performed by: RADIOLOGY

## 2024-07-01 ENCOUNTER — OFFICE VISIT (OUTPATIENT)
Dept: OBSTETRICS AND GYNECOLOGY | Facility: CLINIC | Age: 54
End: 2024-07-01
Payer: COMMERCIAL

## 2024-07-01 VITALS — BODY MASS INDEX: 32.44 KG/M2 | DIASTOLIC BLOOD PRESSURE: 74 MMHG | SYSTOLIC BLOOD PRESSURE: 110 MMHG | WEIGHT: 201 LBS

## 2024-07-01 DIAGNOSIS — N61.1 BREAST ABSCESS: Primary | ICD-10-CM

## 2024-07-01 RX ORDER — AMOXICILLIN AND CLAVULANATE POTASSIUM 875; 125 MG/1; MG/1
1 TABLET, FILM COATED ORAL 2 TIMES DAILY
Qty: 14 TABLET | Refills: 0 | Status: SHIPPED | OUTPATIENT
Start: 2024-07-01 | End: 2024-07-08

## 2024-07-01 NOTE — PROGRESS NOTES
OBGYN Office Note      Chief Complaint   Patient presents with    Breast Problem        Subjective     HPI  Velvet Rodgers is a 53 y.o. female, , who presents with breast complaints of a  possible cyst on right breast .  She reports she had something similar to this about 10 years ago and was told it was a possible ingrown hair. She reports when she squeezes her breast she will have some purulent drainage. She denies any pain, redness, warmth, swelling.     GYN annual and pap complete w/ Dr Sarmiento 2024.     She states she has experienced this problem for 1 week. The problem has improved since it began. The patient denies breast tenderness, changed mole, dryness, nipple discharge, pruritus, rash, skin color change, and skin lesion(s). She has had a mammogram before. Last mammogram 2024; ACR BI-RADS CATEGORY: 1, NEGATIVE . She does not have a family history of breast cancer. Other concerns include: none    No LMP recorded. Patient has had an ablation..      Current contraception: contraceptive methods: Ablation    Last mammogram:   Last Completed Mammogram            Ordered - MAMMOGRAM (Every 2 Years) Ordered on 2024  Mammo Screening Digital Tomosynthesis Bilateral With CAD    2023  Mammo Screening Digital Tomosynthesis Bilateral With CAD    2022  Mammo Screening Digital Tomosynthesis Bilateral With CAD    2021  Mammo Screening Digital Tomosynthesis Bilateral With CAD    2019  Mammo Screening Digital Tomosynthesis Bilateral With CAD    Only the first 5 history entries have been loaded, but more history exists.                      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/2020    removed by Dr Jaeger at  10/2020 (first symptoms 2017)    Wears glasses        Past Surgical History:   Procedure Laterality Date    CHOLECYSTECTOMY      ENDOMETRIAL ABLATION  2014    with polypectomy for abnormal uterine bleeding    LAPAROSCOPIC CHOLECYSTECTOMY  2014    MOLE REMOVAL  02/2007    from face    SPLENECTOMY  10/20    TUMOR REMOVAL  10/02/2020    from pancreas    URETEROSCOPY LASER LITHOTRIPSY WITH STENT INSERTION Left 05/27/2020    x2    WISDOM TOOTH EXTRACTION      all 4 removed        Family History   Problem Relation Age of Onset    Colon cancer Mother 56    Hypertension Mother     Stroke Mother     Diabetes Father     Heart disease Father     Hypertension Father     Breast cancer Neg Hx     Ovarian cancer Neg Hx           Current Outpatient Medications:     Accu-Chek FastClix Lancets misc, 1 each by Other route Daily., Disp: , Rfl:     Accu-Chek Guide test strip, USE 1 ONCE DAILY, Disp: , Rfl:     amoxicillin-clavulanate (AUGMENTIN) 875-125 MG per tablet, Take 1 tablet by mouth 2 (Two) Times a Day for 7 days., Disp: 14 tablet, Rfl: 0    atenolol (TENORMIN) 50 MG tablet, Take 1.5 tablets by mouth Daily., Disp: , Rfl:     atorvastatin (LIPITOR) 10 MG tablet, , Disp: , Rfl:     lisinopril-hydrochlorothiazide (PRINZIDE,ZESTORETIC) 20-25 MG per tablet, Take 1 tablet by mouth Every Night., Disp: , Rfl:     metFORMIN (GLUCOPHAGE) 500 MG tablet, Take 1 tablet by mouth Daily. Daily for 4 weeks, then switch to BID, Disp: , Rfl:     SITagliptin (JANUVIA) 100 MG tablet, Take 1 tablet by mouth Daily., Disp: , Rfl:      Review of Systems   Respiratory: Negative.  Negative for shortness of breath.    Cardiovascular: Negative.  Negative for chest pain.   Gastrointestinal: Negative.  Negative for abdominal distention and abdominal pain.   Genitourinary:  Positive for breast discharge (d/c from breast lesion) and breast lump. Negative for breast pain.       I have reviewed and agree with the HPI,  ROS, and historical information as entered above. Tanna Kilpatrick, APRN      Objective   /74   Wt 91.2 kg (201 lb)   BMI 32.44 kg/m²     Physical Exam  Vitals and nursing note reviewed. Exam conducted with a chaperone present.   Constitutional:       General: She is not in acute distress.     Appearance: Normal appearance. She is not ill-appearing or toxic-appearing.   HENT:      Head: Normocephalic and atraumatic.   Pulmonary:      Effort: Pulmonary effort is normal.   Chest:      Chest wall: No deformity, swelling or tenderness.   Breasts:     Breasts are symmetrical.      Right: Mass present. No swelling, bleeding, inverted nipple, nipple discharge, skin change or tenderness.      Left: No swelling, bleeding, inverted nipple, mass, nipple discharge, skin change or tenderness.       Lymphadenopathy:      Upper Body:      Right upper body: No supraclavicular, axillary or pectoral adenopathy.      Left upper body: No supraclavicular, axillary or pectoral adenopathy.   Neurological:      Mental Status: She is alert and oriented to person, place, and time.   Psychiatric:         Mood and Affect: Mood normal.         Behavior: Behavior normal.           Assessment & Plan     Assessment     Problem List Items Addressed This Visit       Breast abscess - Primary    Relevant Medications    amoxicillin-clavulanate (AUGMENTIN) 875-125 MG per tablet    Other Relevant Orders    Mammo Diagnostic Digital Tomosynthesis Bilateral With CAD    Anaerobic & Aerobic Culture ([facility] Only) - Swab, Breast, Right         Plan   Reassured the patient that the finding is most likely benign.  Discussed need for futher evaluation.  Discussed follow up care and planned to return to clinic as directed.  Scheduled Dx mammogram.  Started antibiotic therapy.  NSAIDs, Tylenol PRN pain  Culture pending. Will change antibiotic if needed.   RTC for s/sx of infection.  Skin abscess education included in patient instructions.   Return in about  1 year (around 2025) for Annual physical or sooner if needed.      BAILEY Young  2024     Procedure: right breast abscess Incision and drainage    Procedures    Risks and benefits discussed?: YES  All questions answered?: YES  Consents given by: The Patient  Written consent obtained?: YES    ANESTHESIA:  Local.    Indications:   Velvet Rodgers is a 53 y.o. female, , who presents today with lesions located on right breast at 5 o'clock. Lesion has been present for 1 week. The patient understands all risks, benefits, indications, potential complications, and alternatives, and freely consents for the procedure. The patient also understands the option of not performing the procedure, the risk for scarring, and the technique of the procedure.    Skin: Breast abscess noted on right breast @ 5 o'clock. Size range is 1 cm.  Physical Exam    Procedure documentation:  After informed consent was obtained, and after the skin was prepped and draped, topical 4% lidocaine cream without epinephrine for anesthetic was applied to skin.  Procedure of incision and drainage of abscess was performed.  A dressing was applied and wound care instructions were provided.  Velvet tolerated the procedure well and without complications.  The patient will be alert for any signs of cutaneous infection and will follow up as instructed.  She tolerated the procedure well.  There were no complications.    Discussed follow up care and planned to return to clinic as directed.    Tanna Kilpatrick, BAILEY  2024

## 2024-07-08 LAB
BACTERIA SPEC AEROBE CULT: NORMAL
BACTERIA SPEC ANAEROBE CULT: NORMAL
BACTERIA SPEC CULT: NORMAL
BACTERIA SPEC CULT: NORMAL

## 2024-07-12 ENCOUNTER — HOSPITAL ENCOUNTER (OUTPATIENT)
Facility: HOSPITAL | Age: 54
Discharge: HOME | End: 2024-07-12
Payer: COMMERCIAL

## 2024-07-12 DIAGNOSIS — N61.1 BREAST ABSCESS: ICD-10-CM

## 2024-07-12 PROCEDURE — G0279 TOMOSYNTHESIS, MAMMO: HCPCS

## 2024-07-12 PROCEDURE — 77065 DX MAMMO INCL CAD UNI: CPT

## 2024-07-12 PROCEDURE — 76642 ULTRASOUND BREAST LIMITED: CPT

## 2025-03-18 ENCOUNTER — TRANSCRIBE ORDERS (OUTPATIENT)
Dept: ADMINISTRATIVE | Facility: HOSPITAL | Age: 55
End: 2025-03-18
Payer: COMMERCIAL

## 2025-03-18 DIAGNOSIS — Z12.31 VISIT FOR SCREENING MAMMOGRAM: Primary | ICD-10-CM

## 2025-05-01 ENCOUNTER — TELEPHONE (OUTPATIENT)
Dept: OBSTETRICS AND GYNECOLOGY | Facility: CLINIC | Age: 55
End: 2025-05-01
Payer: COMMERCIAL

## 2025-05-01 NOTE — TELEPHONE ENCOUNTER
Established patient; LOV 07/01/24. NOV is tomorrow.   Returned patient's call.   Informed her that last Pap performed here was 12/20/21; she is due for another pap.   Patient v/u and agreed.

## 2025-05-01 NOTE — TELEPHONE ENCOUNTER
"    Caller: Velvet Rodgers \"Mallorie\"    Relationship to patient: Self    Best call back number: [phone] (home)       Patient is needing: PT WOULD LIKE TO KNOW IF SHE IS GETTING A PAP SMEAR AT HER ANNUAL VISIT TOMORROW. SHE WOULD LIKE TO GET ONE DONE. CAN LEAVE DETAILS ON VM IF SHE DOES NOT ANSWER WHEN SOMEONE CALLS BACK.  "

## 2025-05-02 ENCOUNTER — OFFICE VISIT (OUTPATIENT)
Dept: OBSTETRICS AND GYNECOLOGY | Facility: CLINIC | Age: 55
End: 2025-05-02
Payer: COMMERCIAL

## 2025-05-02 VITALS
BODY MASS INDEX: 33.59 KG/M2 | SYSTOLIC BLOOD PRESSURE: 110 MMHG | WEIGHT: 209 LBS | HEIGHT: 66 IN | DIASTOLIC BLOOD PRESSURE: 74 MMHG

## 2025-05-02 DIAGNOSIS — Z01.419 WOMEN'S ANNUAL ROUTINE GYNECOLOGICAL EXAMINATION: Primary | ICD-10-CM

## 2025-05-02 DIAGNOSIS — Z12.4 SCREENING FOR CERVICAL CANCER: ICD-10-CM

## 2025-05-02 DIAGNOSIS — Z13.820 SCREENING FOR OSTEOPOROSIS: ICD-10-CM

## 2025-05-02 NOTE — PROGRESS NOTES
Gynecologic Annual Exam Note        GYN Annual Exam     CC - Here for annual exam.        HPI  Velvet Rodgers is a 54 y.o. female, , who presents for annual well woman exam as a established patient.  She is postmenopausal.. Denies vaginal bleeding.   There were no changes to her medical or surgical history since her last visit. Marital Status: .  She is not currently sexually active. STD testing recommendations have been explained to the patient and she declines STD testing.    The patient would like to discuss the following complaints today: none    Additional OB/GYN History   On HRT? No    Last Pap : 21. Results: negative. HPV: negative.   Last Completed Pap Smear    This patient has no relevant Health Maintenance data.       History of abnormal Pap smear: no  Family history of uterine, colon, breast, or ovarian cancer: yes - Colon Ca- Mother  Performs monthly Self-Breast Exam: occasional  Last mammogram: 24. Done at . There is a copy in the chart. Diagnostic on R breast on 24 to follow up on lump. Scheduled for mammogram in July.    Last Completed Mammogram            Awaiting Completion       MAMMOGRAM (Every 2 Years) Scheduled for 2025  Order placed for Mammo Screening Digital Tomosynthesis Bilateral With CAD by Talya Johnson RegSched Rep    2024  Mammo Diagnostic Digital Tomosynthesis Right With CAD    2024  Mammo Screening Digital Tomosynthesis Bilateral With CAD    2023  Mammo Screening Digital Tomosynthesis Bilateral With CAD    2022  Mammo Screening Digital Tomosynthesis Bilateral With CAD     Only the first 5 history entries have been loaded, but more history exists.                        Last colonoscopy: Last D[facility]A Fall - [facility].     Last Completed Colonoscopy            Upcoming       COLORECTAL CANCER SCREENING (COLONOSCOPY - Every 10 Years) Next due on 10/1/2027      10/01/2017   COLONOSCOPY (Done - normal, per patient; approximate date. Performed at [facility])                            She has never had a bone density scan  Exercises Regularly: yes  Feelings of Anxiety or Depression: no      Tobacco Usage?: No       Current Outpatient Medications:     Accu-Chek FastClix Lancets misc, 1 each by Other route Daily., Disp: , Rfl:     Accu-Chek Guide test strip, USE 1 ONCE DAILY, Disp: , Rfl:     atenolol (TENORMIN) 50 MG tablet, Take 1.5 tablets by mouth Daily., Disp: , Rfl:     atorvastatin (LIPITOR) 10 MG tablet, , Disp: , Rfl:     lisinopril-hydrochlorothiazide (PRINZIDE,ZESTORETIC) 20-25 MG per tablet, Take 1 tablet by mouth Every Night., Disp: , Rfl:     metFORMIN (GLUCOPHAGE) 500 MG tablet, Take 1 tablet by mouth Daily. Daily for 4 weeks, then switch to BID, Disp: , Rfl:     SITagliptin (JANUVIA) 100 MG tablet, Take 1 tablet by mouth Daily., Disp: , Rfl:     Patient denies the need for medication refills today.    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    Diabetes mellitus, type II 2020    as a result of tumor removed from pancreas    History of urinary tract infection     Hypertension     Kidney stones     x3-4      2 had surgery 1 passed on own     Neuroendocrine tumor of pancreas 2020    removed by Dr Jaeger at  10/2020 (first symptoms )    Wears glasses         Past Surgical History:   Procedure Laterality Date    CHOLECYSTECTOMY      ENDOMETRIAL ABLATION      with polypectomy for abnormal uterine bleeding    LAPAROSCOPIC CHOLECYSTECTOMY  2014    MOLE REMOVAL  2007    from face    SPLENECTOMY  10/20    TUMOR REMOVAL  10/02/2020    from pancreas    URETEROSCOPY LASER LITHOTRIPSY WITH STENT INSERTION Left 05/27/2020    x2     "WISDOM TOOTH EXTRACTION      all 4 removed        Health Maintenance   Topic Date Due    DIABETIC FOOT EXAM  Never done    URINE MICROALBUMIN-CREATININE RATIO (uACR)  Never done    Hepatitis B (1 of 3 - 19+ 3-dose series) Never done    Pneumococcal Vaccine 50+ (1 of 2 - PCV) Never done    TDAP/TD VACCINES (1 - Tdap) Never done    ZOSTER VACCINE (1 of 2) Never done    HEPATITIS C SCREENING  Never done    ANNUAL PHYSICAL  Never done    DIABETIC EYE EXAM  09/14/2022    Annual Gynecologic Pelvic and Breast Exam  03/24/2024    COVID-19 Vaccine (1 - 2024-25 season) Never done    PAP SMEAR  12/20/2024    INFLUENZA VACCINE  07/01/2025    HEMOGLOBIN A1C  10/02/2025    MAMMOGRAM  07/12/2026    COLORECTAL CANCER SCREENING  10/01/2027       The additional following portions of the patient's history were reviewed and updated as appropriate: allergies, current medications, past family history, past medical history, past social history, past surgical history, and problem list.    Review of Systems   Respiratory: Negative.  Negative for shortness of breath.    Cardiovascular: Negative.  Negative for chest pain.   Gastrointestinal: Negative.  Negative for abdominal distention, abdominal pain and constipation.   Genitourinary: Negative.  Negative for breast discharge, breast lump, breast pain, dysuria, pelvic pain, pelvic pressure, urinary incontinence, vaginal bleeding, vaginal discharge and vaginal pain. Dyspareunia: N/A.  Psychiatric/Behavioral: Negative.  Negative for depressed mood. The patient is not nervous/anxious.        I have reviewed and agree with the HPI, ROS, and historical information as entered above. Tanna Kilpatrick, APRN      Objective   /74   Ht 167.6 cm (66\")   Wt 94.8 kg (209 lb)   LMP  (LMP Unknown)   BMI 33.73 kg/m²     Physical Exam  Vitals and nursing note reviewed. Exam conducted with a chaperone present.   Constitutional:       General: She is not in acute distress.     Appearance: Normal " appearance. She is well-developed. She is not ill-appearing or toxic-appearing.   HENT:      Head: Normocephalic and atraumatic.   Pulmonary:      Effort: Pulmonary effort is normal. No retractions.   Chest:      Chest wall: No mass.   Breasts:     Breasts are symmetrical.      Right: Normal. No mass, nipple discharge, skin change or tenderness.      Left: Normal. No mass, nipple discharge, skin change or tenderness.   Abdominal:      Palpations: Abdomen is soft. Abdomen is not rigid. There is no mass.      Tenderness: There is no abdominal tenderness. There is no guarding or rebound.      Hernia: No hernia is present. There is no hernia in the left inguinal area or right inguinal area.   Genitourinary:     General: Normal vulva.      Labia:         Right: No rash, tenderness or lesion.         Left: No rash, tenderness or lesion.       Vagina: Normal. No vaginal discharge, erythema, tenderness, bleeding or lesions.      Cervix: No cervical motion tenderness, discharge, friability, lesion, erythema or cervical bleeding.      Uterus: Normal. Not enlarged, not fixed and not tender.       Adnexa: Right adnexa normal and left adnexa normal.        Right: No mass or tenderness.          Left: No mass or tenderness.        Rectum: No external hemorrhoid.   Lymphadenopathy:      Upper Body:      Right upper body: No supraclavicular or axillary adenopathy.      Left upper body: No supraclavicular or axillary adenopathy.   Neurological:      Mental Status: She is alert and oriented to person, place, and time.   Psychiatric:         Mood and Affect: Mood normal.         Behavior: Behavior normal.            Assessment and Plan    Problem List Items Addressed This Visit    None  Visit Diagnoses         Women's annual routine gynecological examination    -  Primary    Relevant Orders    LIQUID-BASED PAP SMEAR WITH HPV GENOTYPING REGARDLESS OF INTERPRETATION ([facility])      Screening for cervical cancer        Relevant Orders     LIQUID-BASED PAP SMEAR WITH HPV GENOTYPING REGARDLESS OF INTERPRETATION ([facility])      Screening for osteoporosis        Relevant Orders    DEXA Bone Density Axial            GYN annual well woman exam.   Pap guidelines reviewed. Pap pending  Reviewed monthly self breast exams.  Instructed to call with lumps, pain, or breast discharge.  Yearly mammograms advised.  Mammogram scheduled 7/2025.  Recommended use of Vitamin D and getting adequate calcium in her diet. (1500mg).Bone health information sheet given to patient.  Osteoporosis screening ordered today.  Reviewed exercise as a preventative health measures.   Colonoscopy f/u as recommended.  Reviewed [facility] ovarian cancer screening program. Information given.  Recommended Flu Vaccine in Fall of each year.  Instructed on [place] exercises and gave patient educational information.  Preventative care education included in patient instructions.   Return for Annual physical or sooner if needed, DEXA.         Tanna Kilpatrick, APRN  05/02/2025

## 2025-05-05 LAB — REF LAB TEST METHOD: NORMAL

## 2025-07-01 ENCOUNTER — RESULTS FOLLOW-UP (OUTPATIENT)
Facility: HOSPITAL | Age: 55
End: 2025-07-01
Payer: COMMERCIAL

## 2025-07-01 LAB
NCCN CRITERIA FLAG: ABNORMAL
TYRER CUZICK SCORE: 6.9

## 2025-07-01 NOTE — PROGRESS NOTES
I left a voice mail and sent a [employer] message requesting a return call to discuss risk assessment results.

## 2025-07-06 ENCOUNTER — DOCUMENTATION (OUTPATIENT)
Dept: GENETICS | Facility: HOSPITAL | Age: 55
End: 2025-07-06
Payer: COMMERCIAL

## 2025-07-06 NOTE — PROGRESS NOTES
This patient recently completed the CARE risk assessment for a mammogram appointment. Based on the patient's responses, NCCN criteria for genetic testing was met. At the time of the assessment, the patient was provided with both written and video educational materials regarding genetic testing.    Navigator follow-up: I have attempted to contact the patient via [employer] message and phone call to discuss the risk assessment results. The patient has not yet responded. My contact information was included in both the [employer] message and voicemail.

## 2025-07-16 ENCOUNTER — HOSPITAL ENCOUNTER (OUTPATIENT)
Dept: MAMMOGRAPHY | Facility: HOSPITAL | Age: 55
Discharge: HOME | End: 2025-07-16
Admitting: INTERNAL MEDICINE
Payer: COMMERCIAL

## 2025-07-16 DIAGNOSIS — Z12.31 VISIT FOR SCREENING MAMMOGRAM: ICD-10-CM

## 2025-07-16 PROCEDURE — 77063 BREAST TOMOSYNTHESIS BI: CPT

## 2025-07-16 PROCEDURE — 77067 SCR MAMMO BI INCL CAD: CPT

## (undated) DEVICE — INTRO ACCSR BLNT TP

## (undated) DEVICE — SYR LUERLOK 50ML

## (undated) DEVICE — Device: Brand: BALLOON3

## (undated) DEVICE — TUBING, SUCTION, 1/4" X 10', STRAIGHT: Brand: MEDLINE

## (undated) DEVICE — NDL BIOP ECHOTIP ULTRA ULTRASND 22G

## (undated) DEVICE — KT BIOGUARD SXN VLV AIR/H20 4PC DISP

## (undated) DEVICE — SINGLE-USE BIOPSY FORCEPS: Brand: RADIAL JAW 4

## (undated) DEVICE — THE BITE BLOCK MAXI, LATEX FREE STRAP IS USED TO PROTECT THE ENDOSCOPE INSERTION TUBE FROM BEING BITTEN BY THE PATIENT.

## (undated) DEVICE — CONTN GRAD MEAS TRIANG 32OZ BLK